# Patient Record
Sex: FEMALE | Race: WHITE | NOT HISPANIC OR LATINO | Employment: FULL TIME | URBAN - METROPOLITAN AREA
[De-identification: names, ages, dates, MRNs, and addresses within clinical notes are randomized per-mention and may not be internally consistent; named-entity substitution may affect disease eponyms.]

---

## 2017-02-01 ENCOUNTER — CONVERSION ENCOUNTER (OUTPATIENT)
Dept: MAMMOGRAPHY | Facility: HOSPITAL | Age: 52
End: 2017-02-01

## 2017-02-13 ENCOUNTER — CONVERSION ENCOUNTER (OUTPATIENT)
Dept: MAMMOGRAPHY | Facility: HOSPITAL | Age: 52
End: 2017-02-13

## 2017-08-14 ENCOUNTER — CONVERSION ENCOUNTER (OUTPATIENT)
Dept: MAMMOGRAPHY | Facility: HOSPITAL | Age: 52
End: 2017-08-14

## 2017-08-22 ENCOUNTER — CONVERSION ENCOUNTER (OUTPATIENT)
Dept: MAMMOGRAPHY | Facility: HOSPITAL | Age: 52
End: 2017-08-22

## 2019-03-06 ENCOUNTER — HOSPITAL ENCOUNTER (OUTPATIENT)
Dept: OTHER | Facility: HOSPITAL | Age: 54
Discharge: HOME | End: 2019-03-06
Attending: NURSE PRACTITIONER

## 2019-09-14 ENCOUNTER — HOSPITAL ENCOUNTER (OUTPATIENT)
Dept: URGENT CARE | Facility: CLINIC | Age: 54
Discharge: HOME | End: 2019-09-14

## 2020-02-28 ENCOUNTER — OFFICE VISIT CONVERTED (OUTPATIENT)
Dept: ORTHOPEDIC SURGERY | Facility: CLINIC | Age: 55
End: 2020-02-28
Attending: PHYSICIAN ASSISTANT

## 2020-02-28 ENCOUNTER — CONVERSION ENCOUNTER (OUTPATIENT)
Dept: ORTHOPEDIC SURGERY | Facility: CLINIC | Age: 55
End: 2020-02-28

## 2020-03-06 ENCOUNTER — HOSPITAL ENCOUNTER (OUTPATIENT)
Dept: MRI IMAGING | Facility: HOSPITAL | Age: 55
Discharge: HOME | End: 2020-03-06
Attending: PHYSICIAN ASSISTANT

## 2020-03-11 ENCOUNTER — OFFICE VISIT CONVERTED (OUTPATIENT)
Dept: ORTHOPEDIC SURGERY | Facility: CLINIC | Age: 55
End: 2020-03-11
Attending: PHYSICIAN ASSISTANT

## 2020-03-11 ENCOUNTER — CONVERSION ENCOUNTER (OUTPATIENT)
Dept: ORTHOPEDIC SURGERY | Facility: CLINIC | Age: 55
End: 2020-03-11

## 2020-03-16 ENCOUNTER — OFFICE VISIT CONVERTED (OUTPATIENT)
Dept: ORTHOPEDIC SURGERY | Facility: CLINIC | Age: 55
End: 2020-03-16
Attending: ORTHOPAEDIC SURGERY

## 2020-05-12 ENCOUNTER — HOSPITAL ENCOUNTER (OUTPATIENT)
Dept: PERIOP | Facility: HOSPITAL | Age: 55
Setting detail: HOSPITAL OUTPATIENT SURGERY
Discharge: HOME | End: 2020-05-12
Attending: ORTHOPAEDIC SURGERY

## 2020-05-27 ENCOUNTER — OFFICE VISIT CONVERTED (OUTPATIENT)
Dept: ORTHOPEDIC SURGERY | Facility: CLINIC | Age: 55
End: 2020-05-27
Attending: PHYSICIAN ASSISTANT

## 2020-05-27 ENCOUNTER — CONVERSION ENCOUNTER (OUTPATIENT)
Dept: ORTHOPEDIC SURGERY | Facility: CLINIC | Age: 55
End: 2020-05-27

## 2020-06-24 ENCOUNTER — OFFICE VISIT CONVERTED (OUTPATIENT)
Dept: ORTHOPEDIC SURGERY | Facility: CLINIC | Age: 55
End: 2020-06-24
Attending: PHYSICIAN ASSISTANT

## 2020-08-10 ENCOUNTER — OFFICE VISIT CONVERTED (OUTPATIENT)
Dept: ORTHOPEDIC SURGERY | Facility: CLINIC | Age: 55
End: 2020-08-10
Attending: PHYSICIAN ASSISTANT

## 2020-09-21 ENCOUNTER — OFFICE VISIT CONVERTED (OUTPATIENT)
Dept: ORTHOPEDIC SURGERY | Facility: CLINIC | Age: 55
End: 2020-09-21
Attending: PHYSICIAN ASSISTANT

## 2020-12-23 ENCOUNTER — HOSPITAL ENCOUNTER (OUTPATIENT)
Dept: OTHER | Facility: HOSPITAL | Age: 55
Discharge: HOME | End: 2020-12-23
Attending: INTERNAL MEDICINE

## 2021-01-16 ENCOUNTER — HOSPITAL ENCOUNTER (OUTPATIENT)
Dept: OTHER | Facility: HOSPITAL | Age: 56
Discharge: HOME | End: 2021-01-16
Attending: INTERNAL MEDICINE

## 2021-05-13 NOTE — PROGRESS NOTES
Progress Note      Patient Name: Vi Cruz   Patient ID: [id number]   Sex: Female   YOB: 1965    Primary Care Provider: Kim BRICEÑO   Referring Provider: Brandon Salmeron MD    Visit Date: 2020    Provider: Cristy Sutton PA-C   Location: [facility]   Location Address: [address] [place] [address]  [id number]   Location Phone: [phone]          Chief Complaint  · Left shoulder pain       History Of Present Illness  Vi Cruz is a 54 year old /White female who presents today to [facility].      She is s/p left arthroscopic SAD/DC, biceps tenotomy and mini open RCR 20 by Dr. Salmeron. She states little to no pain. Her ROM is improved compared to pre op. She made excellent progress in PT. She is back to work without issue. She states her strength is lacking.       Past Medical History  Broken Bones; Scoliosis; Seasonal allergies         Past Surgical History  Ankle surgery; Breast biopsy; Colonoscopy; Dilation and curretage; Hysterectomy         Medication List  cyclobenzaprine 10 mg oral tablet; diclofenac sodium 75 mg oral tablet,delayed release (DR/EC); multivitamin oral tablet; venlafaxine 37.5 mg oral capsule,extended release 24hr         Allergy List  Adhesives; Metals; Milk       Allergies Reconciled  Family Medical History  Bladder Neoplasm, Malignant; Stroke; Cancer, Unspecified; Diabetes, unspecified type; Colon Cancer; Prostate cancer; Family history of Arthritis         Reproductive History   4 Para 3 0 0 0       Social History  Alcohol (Current some day); Alcohol Use (Current some day); lives with spouse; .; 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); Nurse; 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); 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); Working         Review of Systems  · Constitutional  o Denies  o : fever, chills, weight loss  · Cardiovascular  o Denies  o : chest pain, shortness of breath  · Gastrointestinal  o Denies  o : liver  "disease, heartburn, nausea, blood in stools  · Genitourinary  o Denies  o : painful urination, blood in urine  · Integument  o Denies  o : rash, itching  · Neurologic  o Denies  o : headache, weakness, loss of consciousness  · Musculoskeletal  o Admits  o : painful, swollen joints  · Psychiatric  o Denies  o : drug/alcohol addiction, anxiety, depression      Vitals  Date Time BP Position Site L\R Cuff Size HR RR TEMP (F) WT  HT  BMI kg/m2 BSA m2 O2 Sat        09/21/2020 08:38 AM      72 - R   211lbs 6oz 5'  7\" 33.11 2.13 96 %          Physical Examination  · Constitutional  o Appearance  o : well developed, well-nourished, no obvious deformities present  · Head and Face  o Head  o :   § Inspection  § : normocephalic  o Face  o :   § Inspection  § : no facial lesions  · Eyes  o Conjunctivae  o : conjunctivae normal  o Sclerae  o : sclerae white  · Ears, Nose, Mouth and Throat  o Ears  o :   § External Ears  § : appearance within normal limits  § Hearing  § : intact  o Nose  o :   § External Nose  § : appearance normal  · Neck  o Inspection/Palpation  o : normal appearance  o Range of Motion  o : full range of motion  · Respiratory  o Respiratory Effort  o : breathing unlabored  o Inspection of Chest  o : normal appearance  o Auscultation of Lungs  o : no audible wheezing or rales  · Cardiovascular  o Heart  o : regular rate  · Gastrointestinal  o Abdominal Examination  o : soft and non-tender  · Skin and Subcutaneous Tissue  o General Inspection  o : intact, no rashes  · Psychiatric  o General  o : Alert and oriented x3  o Judgement and Insight  o : judgment and insight intact  o Mood and Affect  o : mood normal, affect appropriate  · Left Shoulder  o Inspection  o : Well healed incisions. No signs of infection. Forward flexion 170 degrees. Abduction 170 degrees. ER 75 degrees. IR to L1. Strength 4/5. Neurovascularly intact.           Assessment  · Left shoulder pain, unspecified " chronicity     719.41/M25.512  · History of repair of left rotator cuff     V15.29/Z98.890      Plan  · Medications  o Medications have been Reconciled  o Transition of Care or Provider Policy  · Instructions  o Reviewed the patient's Past Medical, Social, and Family history as well as the ROS at today's visit, no changes.  o Call or return if worsening symptoms.  o She will continue PT for strengthening. Follow Up PRN.            Electronically Signed by: Cristy Sutton PA-C -Author on September 21, 2020 08:56:02 AM  Electronically Co-signed by: Brandon Salmeron MD -Reviewer on September 22, 2020 10:18:07 PM

## 2021-05-13 NOTE — PROGRESS NOTES
Progress Note      Patient Name: Vi Cruz   Patient ID: [id number]   Sex: Female   YOB: 1965    Primary Care Provider: Kim BRICEÑO   Referring Provider: Brandon Salmeron MD    Visit Date: August 10, 2020    Provider: Cristy Sutton PA-C   Location: [facility]   Location Address: [address] [place] [address]  [id number]   Location Phone: [phone]          Chief Complaint  · Left shoulder pain       History Of Present Illness  Vi Cruz is a 54 year old /White female who presents today to [facility].      She is s/p left arthroscopic SAD/DC, biceps tenotomy and mini open RCR 20 by Dr. Salmeron. She is doing well. She is making progress in PT. She states pain is improved and her motion is getting better.       Past Medical History  Broken Bones; Scoliosis; Seasonal allergies         Past Surgical History  Ankle surgery; Breast biopsy; Colonoscopy; Dilation and curretage; Hysterectomy         Medication List  cyclobenzaprine 10 mg oral tablet; diclofenac sodium 75 mg oral tablet,delayed release (DR/EC); multivitamin oral tablet; venlafaxine 37.5 mg oral capsule,extended release 24hr         Allergy List  Adhesives; Metals; Milk       Allergies Reconciled  Family Medical History  Bladder Neoplasm, Malignant; Stroke; Cancer, Unspecified; Diabetes, unspecified type; Colon Cancer; Prostate cancer; Family history of Arthritis         Reproductive History   4 Para 3 0 0 0       Social History  Alcohol (Current some day); Alcohol Use (Current some day); lives with spouse; .; 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); Nurse; 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); 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); Working         Review of Systems  · Constitutional  o Denies  o : fever, chills, weight loss  · Cardiovascular  o Denies  o : chest pain, shortness of breath  · Gastrointestinal  o Denies  o : liver disease, heartburn, nausea, blood in stools  · Genitourinary  o Denies  o :  "painful urination, blood in urine  · Integument  o Denies  o : rash, itching  · Neurologic  o Denies  o : headache, weakness, loss of consciousness  · Musculoskeletal  o Admits  o : painful, swollen joints  · Psychiatric  o Denies  o : drug/alcohol addiction, anxiety, depression      Vitals  Date Time BP Position Site L\R Cuff Size HR RR TEMP (F) WT  HT  BMI kg/m2 BSA m2 O2 Sat        08/10/2020 08:38 AM      96 - R   211lbs 2oz 5'  7\" 33.07 2.13 97 %          Physical Examination  · Constitutional  o Appearance  o : well developed, well-nourished, no obvious deformities present  · Head and Face  o Head  o :   § Inspection  § : normocephalic  o Face  o :   § Inspection  § : no facial lesions  · Eyes  o Conjunctivae  o : conjunctivae normal  o Sclerae  o : sclerae white  · Ears, Nose, Mouth and Throat  o Ears  o :   § External Ears  § : appearance within normal limits  § Hearing  § : intact  o Nose  o :   § External Nose  § : appearance normal  · Neck  o Inspection/Palpation  o : normal appearance  o Range of Motion  o : full range of motion  · Respiratory  o Respiratory Effort  o : breathing unlabored  o Inspection of Chest  o : normal appearance  o Auscultation of Lungs  o : no audible wheezing or rales  · Cardiovascular  o Heart  o : regular rate  · Gastrointestinal  o Abdominal Examination  o : soft and non-tender  · Skin and Subcutaneous Tissue  o General Inspection  o : intact, no rashes  · Psychiatric  o General  o : Alert and oriented x3  o Judgement and Insight  o : judgment and insight intact  o Mood and Affect  o : mood normal, affect appropriate  · Left Shoulder  o Inspection  o : Well healed incisions. No signs of infection. Forward flexion 120 degrees. Abduction 100 degrees. ER 60 degrees. IR to L1. Strength 4/5. Neurovascularly intact.           Assessment  · Aftercare following surgery of the muskuloskeletal system     V54.81  · Left shoulder pain, unspecified " chronicity     719.41/M25.512      Plan  · Medications  o Medications have been Reconciled  o Transition of Care or Provider Policy  · Instructions  o Reviewed the patient's Past Medical, Social, and Family history as well as the ROS at today's visit, no changes.  o Call or return if worsening symptoms.  o Continue PT. Follow up 6 weeks.   o Electronically Identified Patient Education Materials Provided Electronically            Electronically Signed by: FERNANDEZ Marin-C -Author on August 10, 2020 09:39:04 AM  Electronically Co-signed by: Brandon Salmeron MD -Reviewer on August 10, 2020 10:02:18 PM

## 2021-05-13 NOTE — PROGRESS NOTES
Progress Note      Patient Name: Vi Cruz   Patient ID: [id number]   Sex: Female   YOB: 1965    Primary Care Provider: Kim BRICEÑO    Visit Date: May 27, 2020    Provider: Cristy Sutton PA-C   Location: [facility]   Location Address: [address] [place] [address]  [id number]   Location Phone: [phone]          Chief Complaint  · Surgery follow up left shoulder      History Of Present Illness  Vi Cruz is a 54 year old /White female who presents today to [facility].      She is s/p left arthroscopic SAD/DC, biceps tenotomy and mini open RCR 20 by Dr. Salmeron. She is doing well. She states pain is improved. She is compliant with ultrasling.       Past Medical History  Broken Bones; Scoliosis; Seasonal allergies         Past Surgical History  Ankle surgery; Breast biopsy; Colonoscopy; Dilation and curretage; Hysterectomy         Medication List  cyclobenzaprine 10 mg oral tablet; diclofenac sodium 75 mg oral tablet,delayed release (DR/EC); multivitamin oral tablet; venlafaxine 37.5 mg oral capsule,extended release 24hr         Allergy List  Adhesives; Metals; Milk       Allergies Reconciled  Family Medical History  Bladder Neoplasm, Malignant; Stroke; Cancer, Unspecified; Diabetes, unspecified type; Colon Cancer; Prostate cancer; Family history of Arthritis         Reproductive History   4 Para 3 0 0 0       Social History  Alcohol (Current some day); Alcohol Use (Current some day); lives with spouse; .; 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); Nurse; 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); 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); Working         Review of Systems  · Constitutional  o Denies  o : fever, chills, weight loss  · Cardiovascular  o Denies  o : chest pain, shortness of breath  · Gastrointestinal  o Denies  o : liver disease, heartburn, nausea, blood in stools  · Genitourinary  o Denies  o : painful urination, blood in  "urine  · Integument  o Denies  o : rash, itching  · Neurologic  o Denies  o : headache, weakness, loss of consciousness  · Musculoskeletal  o Admits  o : painful, swollen joints  · Psychiatric  o Denies  o : drug/alcohol addiction, anxiety, depression      Vitals  Date Time BP Position Site L\R Cuff Size HR RR TEMP (F) WT  HT  BMI kg/m2 BSA m2 O2 Sat        05/27/2020 03:14 PM      85 - R   205lbs 16oz 5'  7\" 32.26 2.1 98 %          Physical Examination  · Constitutional  o Appearance  o : well developed, well-nourished, no obvious deformities present  · Head and Face  o Head  o :   § Inspection  § : normocephalic  o Face  o :   § Inspection  § : no facial lesions  · Eyes  o Conjunctivae  o : conjunctivae normal  o Sclerae  o : sclerae white  · Ears, Nose, Mouth and Throat  o Ears  o :   § External Ears  § : appearance within normal limits  § Hearing  § : intact  o Nose  o :   § External Nose  § : appearance normal  · Neck  o Inspection/Palpation  o : normal appearance  o Range of Motion  o : full range of motion  · Respiratory  o Respiratory Effort  o : breathing unlabored  o Inspection of Chest  o : normal appearance  o Auscultation of Lungs  o : no audible wheezing or rales  · Cardiovascular  o Heart  o : regular rate  · Gastrointestinal  o Abdominal Examination  o : soft and non-tender  · Skin and Subcutaneous Tissue  o General Inspection  o : intact, no rashes  · Psychiatric  o General  o : Alert and oriented x3  o Judgement and Insight  o : judgment and insight intact  o Mood and Affect  o : mood normal, affect appropriate  · Left Shoulder  o Inspection  o : Well approximated incisions. No signs of infection. All compartments soft and well perfused. Sensation grossly intact.           Assessment  · Left Aftercare following surgery of the muskuloskeletal system     V54.81      Plan  · Medications  o Medications have been Reconciled  o Transition of Care or Provider Policy  · Instructions  o Reviewed the " patient's Past Medical, Social, and Family history as well as the ROS at today's visit, no changes.  o Call or return if worsening symptoms.  o Start PT in 2 weeks. Follow up 4 weeks. Continue sling. PROM exercises demonstrated.   o Electronically Identified Patient Education Materials Provided Electronically            Electronically Signed by: MARTA MarinC -Author on May 27, 2020 03:37:47 PM

## 2021-05-13 NOTE — PROGRESS NOTES
Progress Note      Patient Name: Vi Cruz   Patient ID: [id number]   Sex: Female   YOB: 1965    Primary Care Provider: Kim BRICEÑO   Referring Provider: Brandon Salmeron MD    Visit Date: 2020    Provider: Cristy Sutton PA-C   Location: [facility]   Location Address: [address] [place] [address]  [id number]   Location Phone: [phone]          Chief Complaint  · Follow up left shoulder arthroscopy      History Of Present Illness  Vi Cruz is a 54 year old /White female who presents today to [facility].      She is s/p left arthroscopic SAD/DC, biceps tenotomy and mini open RCR 20 by Dr. Salmeron. She is doing well. She is making progress in PT.       Past Medical History  Broken Bones; Scoliosis; Seasonal allergies         Past Surgical History  Ankle surgery; Breast biopsy; Colonoscopy; Dilation and curretage; Hysterectomy         Medication List  cyclobenzaprine 10 mg oral tablet; diclofenac sodium 75 mg oral tablet,delayed release (/EC); multivitamin oral tablet; venlafaxine 37.5 mg oral capsule,extended release 24hr         Allergy List  Adhesives; Metals; Milk       Allergies Reconciled  Family Medical History  Bladder Neoplasm, Malignant; Stroke; Cancer, Unspecified; Diabetes, unspecified type; Colon Cancer; Prostate cancer; Family history of Arthritis         Reproductive History   4 Para 3 0 0 0       Social History  Alcohol (Current some day); Alcohol Use (Current some day); lives with spouse; .; 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); Nurse; 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); 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); Working         Review of Systems  · Constitutional  o Denies  o : fever, chills, weight loss  · Cardiovascular  o Denies  o : chest pain, shortness of breath  · Gastrointestinal  o Denies  o : liver disease, heartburn, nausea, blood in stools  · Genitourinary  o Denies  o : painful urination, blood in  "urine  · Integument  o Denies  o : rash, itching  · Neurologic  o Denies  o : headache, weakness, loss of consciousness  · Musculoskeletal  o Admits  o : painful, swollen joints  · Psychiatric  o Denies  o : drug/alcohol addiction, anxiety, depression      Vitals  Date Time BP Position Site L\R Cuff Size HR RR TEMP (F) WT  HT  BMI kg/m2 BSA m2 O2 Sat        06/24/2020 09:11 AM      93 - R   205lbs 16oz 5'  7\" 32.26 2.1 98 %          Physical Examination  · Constitutional  o Appearance  o : well developed, well-nourished, no obvious deformities present  · Head and Face  o Head  o :   § Inspection  § : normocephalic  o Face  o :   § Inspection  § : no facial lesions  · Eyes  o Conjunctivae  o : conjunctivae normal  o Sclerae  o : sclerae white  · Ears, Nose, Mouth and Throat  o Ears  o :   § External Ears  § : appearance within normal limits  § Hearing  § : intact  o Nose  o :   § External Nose  § : appearance normal  · Neck  o Inspection/Palpation  o : normal appearance  o Range of Motion  o : full range of motion  · Respiratory  o Respiratory Effort  o : breathing unlabored  o Inspection of Chest  o : normal appearance  o Auscultation of Lungs  o : no audible wheezing or rales  · Cardiovascular  o Heart  o : regular rate  · Gastrointestinal  o Abdominal Examination  o : soft and non-tender  · Skin and Subcutaneous Tissue  o General Inspection  o : intact, no rashes  · Psychiatric  o General  o : Alert and oriented x3  o Judgement and Insight  o : judgment and insight intact  o Mood and Affect  o : mood normal, affect appropriate  · Left Shoulder  o Inspection  o : Well healed incision. Forward flexion 90 degrees. Abduction 90 degrees. ER 30 degrees. Strength 3+/5. Neurovascularly intact.          Assessment  · Left Aftercare following surgery of the muskuloskeletal system     V54.81      Plan  · Medications  o Medications have been Reconciled  o Transition of Care or Provider Policy  · Instructions  o Reviewed the " patient's Past Medical, Social, and Family history as well as the ROS at today's visit, no changes.  o Call or return if worsening symptoms.  o Continue Pt. Follow up 6 weeks. Continue work restrictions. Discussed precautions.   o Electronically Identified Patient Education Materials Provided Electronically            Electronically Signed by: FERNANDEZ Marin-C -Author on June 24, 2020 09:21:56 AM  Electronically Co-signed by: Brandon Salmeron MD -Reviewer on June 26, 2020 04:17:05 PM

## 2021-05-14 VITALS — HEIGHT: 67 IN | HEART RATE: 72 BPM | WEIGHT: 211.37 LBS | BODY MASS INDEX: 33.18 KG/M2 | OXYGEN SATURATION: 96 %

## 2021-05-15 VITALS — BODY MASS INDEX: 33.13 KG/M2 | OXYGEN SATURATION: 97 % | HEIGHT: 67 IN | HEART RATE: 96 BPM | WEIGHT: 211.12 LBS

## 2021-05-15 VITALS — BODY MASS INDEX: 31.86 KG/M2 | HEART RATE: 71 BPM | OXYGEN SATURATION: 98 % | HEIGHT: 67 IN | WEIGHT: 203 LBS

## 2021-05-15 VITALS — HEIGHT: 67 IN | WEIGHT: 206 LBS | OXYGEN SATURATION: 98 % | HEART RATE: 93 BPM | BODY MASS INDEX: 32.33 KG/M2

## 2021-05-15 VITALS — BODY MASS INDEX: 32.65 KG/M2 | WEIGHT: 208 LBS | HEART RATE: 72 BPM | OXYGEN SATURATION: 98 % | HEIGHT: 67 IN

## 2021-05-15 VITALS — WEIGHT: 206 LBS | BODY MASS INDEX: 32.33 KG/M2 | HEART RATE: 85 BPM | OXYGEN SATURATION: 98 % | HEIGHT: 67 IN

## 2021-05-15 VITALS — HEART RATE: 71 BPM | OXYGEN SATURATION: 96 % | BODY MASS INDEX: 32.8 KG/M2 | HEIGHT: 67 IN | WEIGHT: 209 LBS

## 2023-04-06 ENCOUNTER — OFFICE VISIT (OUTPATIENT)
Dept: FAMILY MEDICINE CLINIC | Facility: CLINIC | Age: 58
End: 2023-04-06
Payer: COMMERCIAL

## 2023-04-06 VITALS
BODY MASS INDEX: 32.65 KG/M2 | OXYGEN SATURATION: 95 % | HEIGHT: 67 IN | WEIGHT: 208 LBS | SYSTOLIC BLOOD PRESSURE: 134 MMHG | HEART RATE: 88 BPM | TEMPERATURE: 97.3 F | DIASTOLIC BLOOD PRESSURE: 88 MMHG

## 2023-04-06 DIAGNOSIS — Z11.59 NEED FOR HEPATITIS C SCREENING TEST: ICD-10-CM

## 2023-04-06 DIAGNOSIS — L57.0 ACTINIC KERATOSIS: ICD-10-CM

## 2023-04-06 DIAGNOSIS — R53.83 OTHER FATIGUE: ICD-10-CM

## 2023-04-06 DIAGNOSIS — Z12.11 ENCOUNTER FOR SCREENING FOR MALIGNANT NEOPLASM OF COLON: ICD-10-CM

## 2023-04-06 DIAGNOSIS — Z12.39 BREAST CANCER SCREENING, HIGH RISK PATIENT: ICD-10-CM

## 2023-04-06 DIAGNOSIS — Z00.00 ANNUAL PHYSICAL EXAM: Primary | ICD-10-CM

## 2023-04-06 DIAGNOSIS — Z13.220 SCREENING CHOLESTEROL LEVEL: ICD-10-CM

## 2023-04-06 DIAGNOSIS — R73.09 ELEVATED GLUCOSE: ICD-10-CM

## 2023-04-06 PROCEDURE — 99386 PREV VISIT NEW AGE 40-64: CPT | Performed by: NURSE PRACTITIONER

## 2023-04-06 RX ORDER — CHOLECALCIFEROL (VITAMIN D3) 125 MCG
5 CAPSULE ORAL
COMMUNITY

## 2023-04-06 NOTE — PROGRESS NOTES
Chief Complaint  Annual Exam    PHQ-2 Total Score: 0    Subjective        History reviewed. No pertinent past medical history.  Social History     Tobacco Use   • Smoking status: Never   • Smokeless tobacco: Never   Vaping Use   • Vaping Use: Never used   Substance Use Topics   • Alcohol use: Yes     Comment: social      Current Outpatient Medications on File Prior to Visit   Medication Sig   • Biotin 10 MG chewable tablet Chew.   • cholecalciferol (VITAMIN D3) 10 MCG (400 UNIT) tablet Take  by mouth Daily.   • cyanocobalamin (VITAMIN B-12) 1000 MCG tablet Take  by mouth Daily.   • melatonin 5 MG tablet tablet Take 1 tablet by mouth.   • Omega-3 Fatty Acids (fish oil) 1000 MG capsule capsule Take  by mouth.   • [DISCONTINUED] tamoxifen (NOLVADEX) 20 MG chemo tablet Take 1 tablet by mouth Daily.   • [DISCONTINUED] venlafaxine XR (EFFEXOR-XR) 37.5 MG 24 hr capsule Take 1 capsule by mouth Daily.     No current facility-administered medications on file prior to visit.      Allergies   Allergen Reactions   • Milk-Related Compounds Other (See Comments)     GI UPSET    • Lac Bovis GI Intolerance   • Adhesive Tape Rash   • Other Itching     All METALS per patient      Health Maintenance Due   Topic Date Due   • TDAP/TD VACCINES (1 - Tdap) Never done   • COVID-19 Vaccine (2 - Mixed Product series) 02/13/2021   • HEPATITIS C SCREENING  Never done   • ANNUAL PHYSICAL  Never done   • COLORECTAL CANCER SCREENING  12/20/2022      Vi Cruz presents to [facility]  History of Present Illness  Here for an annual wellness exam. 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     Last colon cancer screening was after hysterectomy. She was told to follow up in 5 years, over due. Hysterectomy .     She was on Tamoxifen for 5 years for a breast cancer growth but not stageable. And had a partial mastectomy. Pt states she has [facility] imaging but no longer wants to go to [place].  "Denies lumps, tenderness, or change in skin.     Elevated BP reading in office. States she is usually normotensive.     Exercise includes walking in the hospital. Has gym membership.     Has adult children.    Multiple skin lesions to lower legs and chest.      Right lateral posterior knee with spider veins.     Sees chiropractor for scoliosis and notes increased tightening.     Patient reports that she will check her glucose occasionally and that it is usually not below 100 fasting.  Notes family history of diabetes.      Objective   Vital Signs:   /88   Pulse 88   Temp 97.3 °F (36.3 °C)   Ht 168.9 cm (66.5\")   Wt 94.3 kg (208 lb)   SpO2 95%   BMI 33.07 kg/m²     Review of Systems   Constitutional: Positive for fatigue. Negative for unexpected weight gain and unexpected weight loss.   HENT: Negative for trouble swallowing.    Respiratory: Negative for cough, shortness of breath and wheezing.    Cardiovascular: Negative for chest pain, palpitations and leg swelling.   Endocrine: Negative for polydipsia and polyuria.   Genitourinary: Negative for breast discharge, breast lump and breast pain.      Physical Exam  Vitals reviewed.   Constitutional:       General: She is not in acute distress.     Appearance: Normal appearance. She is well-developed.   HENT:      Head: Normocephalic and atraumatic.      Right Ear: External ear normal.      Left Ear: External ear normal.   Eyes:      Conjunctiva/sclera: Conjunctivae normal.      Pupils: Pupils are equal, round, and reactive to light.   Neck:      Thyroid: No thyroid mass, thyromegaly or thyroid tenderness.   Cardiovascular:      Rate and Rhythm: Normal rate and regular rhythm.      Heart sounds: Normal heart sounds.   Pulmonary:      Effort: Pulmonary effort is normal.      Breath sounds: Normal breath sounds.   Musculoskeletal:      Cervical back: Neck supple. No tenderness.      Right lower leg: No edema.      Left lower leg: No edema.   Skin:     General: " Skin is warm and dry.          Neurological:      Mental Status: She is alert and oriented to person, place, and time.   Psychiatric:         Mood and Affect: Mood and affect normal.         Behavior: Behavior normal.         Thought Content: Thought content normal.         Judgment: Judgment normal.        Result Review :   The following data was reviewed by: KEYANNA Kaba on 04/06/2023:  SCANNED - COLONOSCOPY (12/20/2017)    SCANNED PATHOLOGY (06/20/2017)           Assessment and Plan    Diagnoses and all orders for this visit:    1. Annual physical exam (Primary)  -     Comprehensive Metabolic Panel  -     Urinalysis With Culture If Indicated -  -     Hemoglobin A1c    2. Encounter for screening for malignant neoplasm of colon  -     Ambulatory Referral For Screening Colonoscopy    3. Breast cancer screening, high risk patient  -     MRI Breast Bilateral Screening With & Without Contrast; Future    4. Other fatigue  -     CBC & Differential  -     TSH Rfx On Abnormal To Free T4  -     Vitamin B12 & Folate  -     Vitamin D,25-Hydroxy    5. Screening cholesterol level  -     Lipid Panel    6. Elevated glucose  -     Hemoglobin A1c    7. Actinic keratosis  -     Ambulatory Referral to Dermatology    8. Need for hepatitis C screening test  -     Hepatitis C Antibody    Follow a healthy diet and get regular exercise.    Follow Up   Return in about 1 year (around 4/6/2024) for Annual physical.  Patient was given instructions and counseling regarding her condition or for health maintenance advice. Please see specific information pulled into the AVS if appropriate.

## 2023-05-09 ENCOUNTER — LAB (OUTPATIENT)
Dept: LAB | Facility: HOSPITAL | Age: 58
End: 2023-05-09
Payer: COMMERCIAL

## 2023-05-09 LAB
25(OH)D3 SERPL-MCNC: 48 NG/ML (ref 30–100)
ALBUMIN SERPL-MCNC: 4.5 G/DL (ref 3.5–5.2)
ALBUMIN/GLOB SERPL: 1.7 G/DL
ALP SERPL-CCNC: 63 U/L (ref 39–117)
ALT SERPL W P-5'-P-CCNC: 25 U/L (ref 1–33)
ANION GAP SERPL CALCULATED.3IONS-SCNC: 10.5 MMOL/L (ref 5–15)
AST SERPL-CCNC: 26 U/L (ref 1–32)
BACTERIA UR QL AUTO: ABNORMAL /HPF
BASOPHILS # BLD AUTO: 0.06 10*3/MM3 (ref 0–0.2)
BASOPHILS NFR BLD AUTO: 1.3 % (ref 0–1.5)
BILIRUB SERPL-MCNC: 0.4 MG/DL (ref 0–1.2)
BILIRUB UR QL STRIP: NEGATIVE
BUN SERPL-MCNC: 13 MG/DL (ref 6–20)
BUN/CREAT SERPL: 13.7 (ref 7–25)
CALCIUM SPEC-SCNC: 10.3 MG/DL (ref 8.6–10.5)
CHLORIDE SERPL-SCNC: 100 MMOL/L (ref 98–107)
CHOLEST SERPL-MCNC: 229 MG/DL (ref 0–200)
CLARITY UR: CLEAR
CO2 SERPL-SCNC: 27.5 MMOL/L (ref 22–29)
COLOR UR: YELLOW
CREAT SERPL-MCNC: 0.95 MG/DL (ref 0.57–1)
DEPRECATED RDW RBC AUTO: 39 FL (ref 37–54)
EGFRCR SERPLBLD CKD-EPI 2021: 70.5 ML/MIN/1.73
EOSINOPHIL # BLD AUTO: 0.21 10*3/MM3 (ref 0–0.4)
EOSINOPHIL NFR BLD AUTO: 4.6 % (ref 0.3–6.2)
ERYTHROCYTE [DISTWIDTH] IN BLOOD BY AUTOMATED COUNT: 11.9 % (ref 12.3–15.4)
FOLATE SERPL-MCNC: 10.9 NG/ML (ref 4.78–24.2)
GLOBULIN UR ELPH-MCNC: 2.7 GM/DL
GLUCOSE SERPL-MCNC: 105 MG/DL (ref 65–99)
GLUCOSE UR STRIP-MCNC: NEGATIVE MG/DL
HBA1C MFR BLD: 6 % (ref 4.8–5.6)
HCT VFR BLD AUTO: 39.4 % (ref 34–46.6)
HCV AB SER DONR QL: NORMAL
HDLC SERPL-MCNC: 83 MG/DL (ref 40–60)
HGB BLD-MCNC: 13.6 G/DL (ref 12–15.9)
HGB UR QL STRIP.AUTO: NEGATIVE
HYALINE CASTS UR QL AUTO: ABNORMAL /LPF
IMM GRANULOCYTES # BLD AUTO: 0.04 10*3/MM3 (ref 0–0.05)
IMM GRANULOCYTES NFR BLD AUTO: 0.9 % (ref 0–0.5)
KETONES UR QL STRIP: NEGATIVE
LDLC SERPL CALC-MCNC: 129 MG/DL (ref 0–100)
LDLC/HDLC SERPL: 1.52 {RATIO}
LEUKOCYTE ESTERASE UR QL STRIP.AUTO: ABNORMAL
LYMPHOCYTES # BLD AUTO: 1.64 10*3/MM3 (ref 0.7–3.1)
LYMPHOCYTES NFR BLD AUTO: 36 % (ref 19.6–45.3)
MCH RBC QN AUTO: 30.5 PG (ref 26.6–33)
MCHC RBC AUTO-ENTMCNC: 34.5 G/DL (ref 31.5–35.7)
MCV RBC AUTO: 88.3 FL (ref 79–97)
MONOCYTES # BLD AUTO: 0.38 10*3/MM3 (ref 0.1–0.9)
MONOCYTES NFR BLD AUTO: 8.4 % (ref 5–12)
NEUTROPHILS NFR BLD AUTO: 2.22 10*3/MM3 (ref 1.7–7)
NEUTROPHILS NFR BLD AUTO: 48.8 % (ref 42.7–76)
NITRITE UR QL STRIP: NEGATIVE
NRBC BLD AUTO-RTO: 0 /100 WBC (ref 0–0.2)
PH UR STRIP.AUTO: 6.5 [PH] (ref 5–8)
PLATELET # BLD AUTO: 259 10*3/MM3 (ref 140–450)
PMV BLD AUTO: 10.5 FL (ref 6–12)
POTASSIUM SERPL-SCNC: 4.5 MMOL/L (ref 3.5–5.2)
PROT SERPL-MCNC: 7.2 G/DL (ref 6–8.5)
PROT UR QL STRIP: NEGATIVE
RBC # BLD AUTO: 4.46 10*6/MM3 (ref 3.77–5.28)
RBC # UR STRIP: ABNORMAL /HPF
REF LAB TEST METHOD: ABNORMAL
SODIUM SERPL-SCNC: 138 MMOL/L (ref 136–145)
SP GR UR STRIP: 1.01 (ref 1–1.03)
SQUAMOUS #/AREA URNS HPF: ABNORMAL /HPF
TRIGL SERPL-MCNC: 100 MG/DL (ref 0–150)
TSH SERPL DL<=0.05 MIU/L-ACNC: 1.62 UIU/ML (ref 0.27–4.2)
UROBILINOGEN UR QL STRIP: ABNORMAL
VIT B12 BLD-MCNC: 942 PG/ML (ref 211–946)
VLDLC SERPL-MCNC: 17 MG/DL (ref 5–40)
WBC # UR STRIP: ABNORMAL /HPF
WBC NRBC COR # BLD: 4.55 10*3/MM3 (ref 3.4–10.8)

## 2023-05-09 PROCEDURE — 87086 URINE CULTURE/COLONY COUNT: CPT | Performed by: NURSE PRACTITIONER

## 2023-05-09 PROCEDURE — 82607 VITAMIN B-12: CPT | Performed by: NURSE PRACTITIONER

## 2023-05-09 PROCEDURE — 86803 HEPATITIS C AB TEST: CPT | Performed by: NURSE PRACTITIONER

## 2023-05-09 PROCEDURE — 82746 ASSAY OF FOLIC ACID SERUM: CPT | Performed by: NURSE PRACTITIONER

## 2023-05-09 PROCEDURE — 80061 LIPID PANEL: CPT | Performed by: NURSE PRACTITIONER

## 2023-05-09 PROCEDURE — 80053 COMPREHEN METABOLIC PANEL: CPT | Performed by: NURSE PRACTITIONER

## 2023-05-09 PROCEDURE — 87088 URINE BACTERIA CULTURE: CPT | Performed by: NURSE PRACTITIONER

## 2023-05-09 PROCEDURE — 81001 URINALYSIS AUTO W/SCOPE: CPT | Performed by: NURSE PRACTITIONER

## 2023-05-09 PROCEDURE — 82306 VITAMIN D 25 HYDROXY: CPT | Performed by: NURSE PRACTITIONER

## 2023-05-09 PROCEDURE — 85025 COMPLETE CBC W/AUTO DIFF WBC: CPT | Performed by: NURSE PRACTITIONER

## 2023-05-09 PROCEDURE — 83036 HEMOGLOBIN GLYCOSYLATED A1C: CPT | Performed by: NURSE PRACTITIONER

## 2023-05-09 PROCEDURE — 84443 ASSAY THYROID STIM HORMONE: CPT | Performed by: NURSE PRACTITIONER

## 2023-05-11 DIAGNOSIS — R31.9 URINARY TRACT INFECTION WITH HEMATURIA, SITE UNSPECIFIED: Primary | ICD-10-CM

## 2023-05-11 DIAGNOSIS — N39.0 URINARY TRACT INFECTION WITH HEMATURIA, SITE UNSPECIFIED: Primary | ICD-10-CM

## 2023-05-11 LAB — BACTERIA SPEC AEROBE CULT: ABNORMAL

## 2023-05-11 RX ORDER — NITROFURANTOIN 25; 75 MG/1; MG/1
100 CAPSULE ORAL 2 TIMES DAILY
Qty: 10 CAPSULE | Refills: 0 | Status: SHIPPED | OUTPATIENT
Start: 2023-05-11

## 2023-09-26 ENCOUNTER — OFFICE VISIT (OUTPATIENT)
Dept: SURGERY | Facility: CLINIC | Age: 58
End: 2023-09-26
Payer: COMMERCIAL

## 2023-09-26 ENCOUNTER — PREP FOR SURGERY (OUTPATIENT)
Dept: OTHER | Facility: HOSPITAL | Age: 58
End: 2023-09-26
Payer: COMMERCIAL

## 2023-09-26 VITALS
WEIGHT: 208.2 LBS | SYSTOLIC BLOOD PRESSURE: 148 MMHG | HEIGHT: 67 IN | HEART RATE: 90 BPM | DIASTOLIC BLOOD PRESSURE: 83 MMHG | BODY MASS INDEX: 32.68 KG/M2

## 2023-09-26 DIAGNOSIS — Z86.010 HISTORY OF COLONIC POLYPS: ICD-10-CM

## 2023-09-26 DIAGNOSIS — Z12.11 SCREENING FOR MALIGNANT NEOPLASM OF COLON: Primary | ICD-10-CM

## 2023-09-26 PROBLEM — M41.9 SCOLIOSIS: Status: ACTIVE | Noted: 2023-09-26

## 2023-09-26 PROBLEM — N60.92 ATYPICAL LOBULAR HYPERPLASIA (ALH) OF LEFT BREAST: Status: ACTIVE | Noted: 2017-09-19

## 2023-09-26 PROBLEM — Z79.810 USE OF TAMOXIFEN (NOLVADEX): Status: ACTIVE | Noted: 2019-12-19

## 2023-09-26 PROBLEM — Z86.0100 HISTORY OF COLONIC POLYPS: Status: ACTIVE | Noted: 2023-09-26

## 2023-09-26 PROCEDURE — S0285 CNSLT BEFORE SCREEN COLONOSC: HCPCS | Performed by: NURSE PRACTITIONER

## 2023-09-26 RX ORDER — SODIUM CHLORIDE 0.9 % (FLUSH) 0.9 %
10 SYRINGE (ML) INJECTION AS NEEDED
OUTPATIENT
Start: 2023-09-26

## 2023-09-26 RX ORDER — SODIUM CHLORIDE 0.9 % (FLUSH) 0.9 %
3 SYRINGE (ML) INJECTION EVERY 12 HOURS SCHEDULED
OUTPATIENT
Start: 2023-09-26

## 2023-09-26 RX ORDER — SODIUM PICOSULFATE, MAGNESIUM OXIDE, AND ANHYDROUS CITRIC ACID 12; 3.5; 1 G/175ML; G/175ML; MG/175ML
2 LIQUID ORAL ONCE
Qty: 175 ML | Refills: 0 | Status: SHIPPED | OUTPATIENT
Start: 2023-09-26 | End: 2023-09-26

## 2023-09-26 RX ORDER — SODIUM CHLORIDE 9 MG/ML
40 INJECTION, SOLUTION INTRAVENOUS AS NEEDED
OUTPATIENT
Start: 2023-09-26

## 2023-09-26 NOTE — PROGRESS NOTES
Chief Complaint: Colonoscopy (Consult)    Subjective      Colonoscopy consultation       History of Present Illness  Vi Cruz is a 57 y.o. female presents to [facility] for colonoscopy consultation.    Patient presents today on referral from Silvia Whitehead for colonoscopy consultation.  Patient denies any abdominal pain, change in bowel, or rectal bleeding.  Denies any family history of colorectal cancer.  Admits to history of colonic polyps.    12/17: colonoscopy ([facility]): Ascending - hyperplastic    Objective     History reviewed. No pertinent past medical history.    Past Surgical History:   Procedure Laterality Date    ANKLE ARTHROSCOPY Left     HYSTERECTOMY      SHOULDER ARTHROSCOPY W/ ROTATOR CUFF REPAIR Left        Outpatient Medications Marked as Taking for the 9/26/23 encounter (Office Visit) with Jeanine Harrell, KEAYNNA   Medication Sig Dispense Refill    Biotin 10 MG chewable tablet Chew.      cholecalciferol (VITAMIN D3) 10 MCG (400 UNIT) tablet Take  by mouth Daily.      cyanocobalamin (VITAMIN B-12) 1000 MCG tablet Take  by mouth Daily.      melatonin 5 MG tablet tablet Take 1 tablet by mouth.      Omega-3 Fatty Acids (fish oil) 1000 MG capsule capsule Take  by mouth.         Allergies   Allergen Reactions    Milk-Related Compounds Other (See Comments)     GI UPSET     Milk (Cow) GI Intolerance    Adhesive Tape Rash    Other Itching     All METALS per patient        Family History   Problem Relation Age of Onset    Diabetes Father     Cancer Father         prostate and bladder    Diabetes Brother     Cancer Brother         prostate    Diabetes Brother     Cancer Brother         testicular    Diabetes Brother        Social History     Socioeconomic History    Marital status:    Tobacco Use    Smoking status: Never    Smokeless tobacco: Never   Vaping Use    Vaping Use: Never used   Substance and Sexual Activity    Alcohol use: Yes     Comment: social  "      Review of Systems   Constitutional:  Negative for chills and fever.   Gastrointestinal:  Negative for abdominal distention, abdominal pain, anal bleeding, blood in stool, constipation, diarrhea and rectal pain.      Vital Signs:   /83   Pulse 90   Ht 168.9 cm (66.5\")   Wt 94.4 kg (208 lb 3.2 oz)   BMI 33.10 kg/m²      Physical Exam  Vitals and nursing note reviewed.   Constitutional:       General: She is not in acute distress.     Appearance: Normal appearance.   HENT:      Head: Normocephalic.   Cardiovascular:      Rate and Rhythm: Normal rate.   Pulmonary:      Effort: Pulmonary effort is normal.   Abdominal:      Palpations: Abdomen is soft.      Tenderness: There is no guarding.   Musculoskeletal:         General: No deformity. Normal range of motion.   Skin:     General: Skin is warm and dry.      Coloration: Skin is not jaundiced.   Neurological:      General: No focal deficit present.      Mental Status: She is alert and oriented to person, place, and time.   Psychiatric:         Mood and Affect: Mood normal.         Thought Content: Thought content normal.        Result Review :          []  Laboratory  []  Radiology  []  Pathology  []  Microbiology  []  EKG/Telemetry   []  Cardiology/Vascular   []  Old records  I spent 15 minutes caring for Vi on this date of service. This time includes time spent by me in the following activities: reviewing tests, obtaining and/or reviewing a separately obtained history, performing a medically appropriate examination and/or evaluation, ordering medications, tests, or procedures, and documenting information in the medical record.     Assessment and Plan    Diagnoses and all orders for this visit:    1. Screening for malignant neoplasm of colon (Primary)    2. History of colonic polyps    Other orders  -     Sod Picosulfate-Mag Ox-Cit Acd (Clenpiq) 10-3.5-12 MG-GM -GM/175ML solution; Take 2 bottles by mouth 1 (One) Time for 1 dose.  Dispense: 175 mL; " Refill: 0        Follow Up   Return for Schedule colonoscopy Dr. Merchant on 2/29/2024 [facility].    Hospital arrival time: 0800.    Possible risks/complications, benefits, and alternatives to surgical or invasive procedures have been explained to patient and/or legal guardian.    Patient has been evaluated and can tolerate anesthesia and/or sedation. Risks, benefits, and alternatives to anesthesia and sedation have been explained to the patient and/or legal guardian. Patient verbalizes understanding and is willing to proceed with the above plan.     Patient was given instructions and counseling regarding her condition or for health maintenance advice. Please see specific information pulled into the AVS if appropriate.

## 2024-02-20 ENCOUNTER — OFFICE VISIT (OUTPATIENT)
Dept: FAMILY MEDICINE CLINIC | Facility: CLINIC | Age: 59
End: 2024-02-20
Payer: COMMERCIAL

## 2024-02-20 VITALS
DIASTOLIC BLOOD PRESSURE: 76 MMHG | HEIGHT: 67 IN | BODY MASS INDEX: 32.8 KG/M2 | WEIGHT: 209 LBS | TEMPERATURE: 98.3 F | OXYGEN SATURATION: 98 % | HEART RATE: 79 BPM | SYSTOLIC BLOOD PRESSURE: 138 MMHG

## 2024-02-20 DIAGNOSIS — N60.92 ATYPICAL LOBULAR HYPERPLASIA (ALH) OF LEFT BREAST: ICD-10-CM

## 2024-02-20 DIAGNOSIS — N64.4 BREAST PAIN: ICD-10-CM

## 2024-02-20 DIAGNOSIS — R53.83 FATIGUE, UNSPECIFIED TYPE: ICD-10-CM

## 2024-02-20 DIAGNOSIS — Z71.85 VACCINE COUNSELING: ICD-10-CM

## 2024-02-20 DIAGNOSIS — Z00.00 WELL WOMAN EXAM WITHOUT GYNECOLOGICAL EXAM: Primary | ICD-10-CM

## 2024-02-20 DIAGNOSIS — R73.03 PREDIABETES: ICD-10-CM

## 2024-02-20 DIAGNOSIS — F43.29 GRIEF REACTION WITH PROLONGED BEREAVEMENT: ICD-10-CM

## 2024-02-20 RX ORDER — VENLAFAXINE HYDROCHLORIDE 37.5 MG/1
37.5 CAPSULE, EXTENDED RELEASE ORAL DAILY
Qty: 90 CAPSULE | Refills: 1 | Status: SHIPPED | OUTPATIENT
Start: 2024-02-20

## 2024-02-20 NOTE — PROGRESS NOTES
Chief Complaint  Wellness Check    Subjective            Vi Cruz presents to [facility]  History of Present Illness  Patient is here today for her annual wellness check and fasting labs without Pap smear--prior Hx SHANELLE amd R/T fibroid     And also with the DX of atypical lobular hyperplasia left breast and had lumpectomy and did 5 years of tamoxifen as well--and patient reports normally every 6 months alternating she will have the diagnostic mammogram and then 6 months later she will have the MRI of the breasts and then 6 months later back to the mammogram and then 6 months later back to the MRI-and she is actually due for the MRI and is no longer seeing her provider in [place] as everything has been stable and she is off of the tamoxifen now but would like to proceed with getting this ordered and she has been having some left breast tenderness but no palpable lumps    Also reports that in the past she was on the SNRI Effexor XR 37.5 mg daily and she did very well on that in the past and she lost both of her parents less than 6 weeks apart last year and she still grieving and feels overwhelmed at times tearful at times said and may be a little depressed and some anxiety and was wondering if restarting this might be beneficial denies all SI/HI    Also is going next month for her colonoscopy    Also would like to go ahead and proceed with the updated Tdap today    And then order the labs fasting and she will get these done at the hospital          PHQ-2 Total Score: 8  PHQ-9 Total Score: 8    History reviewed. No pertinent past medical history.    Allergies   Allergen Reactions    Milk-Related Compounds Other (See Comments)     GI UPSET     Milk (Cow) GI Intolerance    Adhesive Tape Rash    Other Itching     All METALS per patient        Past Surgical History:   Procedure Laterality Date    ANKLE ARTHROSCOPY Left     HYSTERECTOMY      SHOULDER ARTHROSCOPY W/ ROTATOR CUFF REPAIR  Left         Social History     Tobacco Use    Smoking status: Never    Smokeless tobacco: Never   Vaping Use    Vaping Use: Never used   Substance Use Topics    Alcohol use: Yes     Comment: social       Family History   Problem Relation Age of Onset    Diabetes Father     Cancer Father         prostate and bladder    Diabetes Brother     Cancer Brother         prostate    Diabetes Brother     Cancer Brother         testicular    Diabetes Brother         Health Maintenance Due   Topic Date Due    COLORECTAL CANCER SCREENING  12/20/2022    COVID-19 Vaccine (2 - 2023-24 season) 09/01/2023        Current Outpatient Medications on File Prior to Visit   Medication Sig    Biotin 10 MG chewable tablet Chew.    cholecalciferol (VITAMIN D3) 10 MCG (400 UNIT) tablet Take  by mouth Daily.    cyanocobalamin (VITAMIN B-12) 1000 MCG tablet Take  by mouth Daily.    Omega-3 Fatty Acids (fish oil) 1000 MG capsule capsule Take  by mouth.    [DISCONTINUED] melatonin 5 MG tablet tablet Take 1 tablet by mouth.    [DISCONTINUED] nitrofurantoin, macrocrystal-monohydrate, (Macrobid) 100 MG capsule Take 1 capsule by mouth 2 (Two) Times a Day. (Patient not taking: Reported on 9/26/2023)     No current facility-administered medications on file prior to visit.       Immunization History   Administered Date(s) Administered    COVID-19 (UNSPECIFIED) 01/16/2021    Fluzone (or Fluarix & Flulaval for VFC) >6mos 10/30/2023    Shingrix 04/25/2021, 08/19/2021    Tdap 02/20/2024       Review of Systems   Constitutional:  Positive for fatigue.   HENT:  Negative for trouble swallowing.    Eyes:  Negative for blurred vision and double vision.   Respiratory:  Negative for choking and shortness of breath.    Cardiovascular:  Negative for chest pain.   Gastrointestinal:  Negative for abdominal pain and blood in stool.        Cscope next week    Endocrine: Negative for polydipsia, polyphagia and polyuria.   Genitourinary:  Positive for breast pain. Negative  "for breast discharge, breast lump, dysuria and vaginal bleeding.        Breast pain to left breast --started approx 3-4 months    Musculoskeletal:  Positive for back pain and neck pain.        Chronic Hx of scoliosis    Neurological:  Negative for dizziness, seizures, syncope and light-headedness.   Hematological:  Bruises/bleeds easily.   Psychiatric/Behavioral:  Positive for sleep disturbance and depressed mood. Negative for self-injury and suicidal ideas. The patient is nervous/anxious.         Lost her parents last yr         Objective     /76   Pulse 79   Temp 98.3 °F (36.8 °C) (Temporal)   Ht 168.9 cm (66.5\")   Wt 94.8 kg (209 lb)   SpO2 98%   BMI 33.23 kg/m²       Physical Exam  Vitals and nursing note reviewed.   Constitutional:       Appearance: Normal appearance.   HENT:      Head: Normocephalic.      Right Ear: External ear normal.      Left Ear: External ear normal.      Nose: Nose normal.      Mouth/Throat:      Mouth: Mucous membranes are moist.   Eyes:      Pupils: Pupils are equal, round, and reactive to light.   Cardiovascular:      Rate and Rhythm: Normal rate and regular rhythm.      Heart sounds: Normal heart sounds.   Pulmonary:      Effort: Pulmonary effort is normal.      Breath sounds: Normal breath sounds.   Abdominal:      Palpations: Abdomen is soft.   Musculoskeletal:         General: Normal range of motion.      Cervical back: Normal range of motion and neck supple.   Skin:     General: Skin is warm and dry.   Neurological:      Mental Status: She is alert and oriented to person, place, and time.   Psychiatric:         Mood and Affect: Mood normal.         Behavior: Behavior normal.         Thought Content: Thought content normal.         Judgment: Judgment normal.      Comments: Mildly tearful at times         Result Review :                           Assessment and Plan      Diagnoses and all orders for this visit:    1. Well woman exam without gynecological exam " (Primary)  -     CBC & Differential  -     Comprehensive Metabolic Panel  -     Lipid Panel  -     TSH Rfx On Abnormal To Free T4  -     Urinalysis With Culture If Indicated -  -     Hemoglobin A1c  -     Vitamin D,25-Hydroxy  -     Vitamin B12 & Folate  -     Tdap Vaccine => 8yo IM (BOOSTRIX)    2. Atypical lobular hyperplasia (ALH) of left breast  -     MRI Breast Bilateral Screening With & Without Contrast; Future  -     CBC & Differential  -     Comprehensive Metabolic Panel    3. Prediabetes  -     Hemoglobin A1c    4. Fatigue, unspecified type  -     CBC & Differential  -     Comprehensive Metabolic Panel  -     Lipid Panel  -     TSH Rfx On Abnormal To Free T4  -     Urinalysis With Culture If Indicated -  -     Hemoglobin A1c  -     Vitamin D,25-Hydroxy  -     Vitamin B12 & Folate    5. Breast pain  -     MRI Breast Bilateral Screening With & Without Contrast; Future    6. Grief reaction with prolonged bereavement  -     venlafaxine XR (Effexor XR) 37.5 MG 24 hr capsule; Take 1 capsule by mouth Daily.  Dispense: 90 capsule; Refill: 1    7. 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    Patient was given instructions and counseling regarding her condition or for health maintenance advice. Please see specific information pulled into the AVS if appropriate.            Vi Cruz  reports that she has never smoked. She has never used smokeless  tobacco.. I have educated her on the risk of diseases from using tobacco products such as cancer, COPD, and heart disease.

## 2024-02-28 ENCOUNTER — ANESTHESIA EVENT (OUTPATIENT)
Dept: GASTROENTEROLOGY | Facility: HOSPITAL | Age: 59
End: 2024-02-28
Payer: COMMERCIAL

## 2024-02-28 NOTE — ANESTHESIA PREPROCEDURE EVALUATION
Anesthesia Evaluation     Patient summary reviewed and Nursing notes reviewed   no history of anesthetic complications:   NPO Solid Status: > 8 hours  NPO Liquid Status: > 2 hours           Airway   Mallampati: II  TM distance: >3 FB  Neck ROM: full  No difficulty expected  Dental          Pulmonary - negative pulmonary ROS and normal exam    breath sounds clear to auscultation  Cardiovascular - normal exam  Exercise tolerance: good (4-7 METS)    Rhythm: regular  Rate: normal        Neuro/Psych  (+) psychiatric history Anxiety and Depression  GI/Hepatic/Renal/Endo    (+) obesity    Musculoskeletal     (+) back pain  Abdominal   (+) obese    Abdomen: soft.  Bowel sounds: normal.   Substance History - negative use     OB/GYN          Other - negative ROS       ROS/Med Hx Other: Screening               Anesthesia Plan    ASA 2     general   total IV anesthesia  (Total IV Anesthesia    Patient understands anesthesia not responsible for dental damage.  )  intravenous induction     Anesthetic plan, risks, benefits, and alternatives have been provided, discussed and informed consent has been obtained with: patient.  Pre-procedure education provided  Use of blood products discussed with patient  Did not consent to blood products.    Plan discussed with CRNA.    CODE STATUS:

## 2024-02-29 ENCOUNTER — ANESTHESIA (OUTPATIENT)
Dept: GASTROENTEROLOGY | Facility: HOSPITAL | Age: 59
End: 2024-02-29
Payer: COMMERCIAL

## 2024-02-29 ENCOUNTER — HOSPITAL ENCOUNTER (OUTPATIENT)
Facility: HOSPITAL | Age: 59
Setting detail: HOSPITAL OUTPATIENT SURGERY
Discharge: HOME | End: 2024-02-29
Attending: SURGERY | Admitting: SURGERY
Payer: COMMERCIAL

## 2024-02-29 VITALS
TEMPERATURE: 98.3 F | RESPIRATION RATE: 13 BRPM | DIASTOLIC BLOOD PRESSURE: 88 MMHG | BODY MASS INDEX: 32.95 KG/M2 | WEIGHT: 207.23 LBS | OXYGEN SATURATION: 95 % | SYSTOLIC BLOOD PRESSURE: 131 MMHG | HEART RATE: 69 BPM

## 2024-02-29 DIAGNOSIS — Z86.010 HISTORY OF COLONIC POLYPS: ICD-10-CM

## 2024-02-29 DIAGNOSIS — Z12.11 SCREENING FOR MALIGNANT NEOPLASM OF COLON: ICD-10-CM

## 2024-02-29 PROCEDURE — 25010000002 PROPOFOL 10 MG/ML EMULSION: Performed by: NURSE ANESTHETIST, CERTIFIED REGISTERED

## 2024-02-29 PROCEDURE — 25810000003 LACTATED RINGERS PER 1000 ML

## 2024-02-29 RX ORDER — SODIUM CHLORIDE 9 MG/ML
40 INJECTION, SOLUTION INTRAVENOUS AS NEEDED
Status: DISCONTINUED | OUTPATIENT
Start: 2024-02-29 | End: 2024-02-29 | Stop reason: HOSPADM

## 2024-02-29 RX ORDER — LIDOCAINE HYDROCHLORIDE 20 MG/ML
INJECTION, SOLUTION EPIDURAL; INFILTRATION; INTRACAUDAL; PERINEURAL AS NEEDED
Status: DISCONTINUED | OUTPATIENT
Start: 2024-02-29 | End: 2024-02-29 | Stop reason: SURG

## 2024-02-29 RX ORDER — PROPOFOL 10 MG/ML
VIAL (ML) INTRAVENOUS AS NEEDED
Status: DISCONTINUED | OUTPATIENT
Start: 2024-02-29 | End: 2024-02-29 | Stop reason: SURG

## 2024-02-29 RX ORDER — SODIUM CHLORIDE 0.9 % (FLUSH) 0.9 %
10 SYRINGE (ML) INJECTION AS NEEDED
Status: DISCONTINUED | OUTPATIENT
Start: 2024-02-29 | End: 2024-02-29 | Stop reason: HOSPADM

## 2024-02-29 RX ORDER — SODIUM CHLORIDE, SODIUM LACTATE, POTASSIUM CHLORIDE, CALCIUM CHLORIDE 600; 310; 30; 20 MG/100ML; MG/100ML; MG/100ML; MG/100ML
30 INJECTION, SOLUTION INTRAVENOUS CONTINUOUS
Status: DISCONTINUED | OUTPATIENT
Start: 2024-02-29 | End: 2024-02-29 | Stop reason: HOSPADM

## 2024-02-29 RX ORDER — SODIUM CHLORIDE 0.9 % (FLUSH) 0.9 %
3 SYRINGE (ML) INJECTION EVERY 12 HOURS SCHEDULED
Status: DISCONTINUED | OUTPATIENT
Start: 2024-02-29 | End: 2024-02-29 | Stop reason: HOSPADM

## 2024-02-29 RX ADMIN — PROPOFOL 165 MCG/KG/MIN: 10 INJECTION, EMULSION INTRAVENOUS at 08:25

## 2024-02-29 RX ADMIN — LIDOCAINE HYDROCHLORIDE 60 MG: 20 INJECTION, SOLUTION EPIDURAL; INFILTRATION; INTRACAUDAL; PERINEURAL at 08:25

## 2024-02-29 RX ADMIN — PROPOFOL 100 MG: 10 INJECTION, EMULSION INTRAVENOUS at 08:25

## 2024-02-29 RX ADMIN — SODIUM CHLORIDE, POTASSIUM CHLORIDE, SODIUM LACTATE AND CALCIUM CHLORIDE 30 ML/HR: 600; 310; 30; 20 INJECTION, SOLUTION INTRAVENOUS at 08:17

## 2024-02-29 NOTE — NURSING NOTE
ABDOMINAL PRESSURE APPLIED PER MD INSTRUCTION TO ASSIST WITH ADVANCEMENT OF SCOPE  Annette Delgadillo RN

## 2024-02-29 NOTE — H&P
General Surgery/Colorectal Surgery Note    Patient Name:  Vi Cruz  YOB: 1965  [id number]    Referring Provider: Selwyn Merchant, *      Patient Care Team:  Gilma Espinoza APRN as PCP - General (Nurse Practitioner)  Jeanine Harrell APRN as Nurse Practitioner (Nurse Practitioner)    Subjective .     History of present illness:    HPI   referral from Silvia Whitehead for colonoscopy consultation.  Patient denies any abdominal pain, change in bowel, or rectal bleeding.  Denies any family history of colorectal cancer.  Admits to history of colonic polyps.     12/17: colonoscopy ([facility]): Ascending - hyperplastic    History:  No past medical history on file.    Past Surgical History:   Procedure Laterality Date    ANKLE ARTHROSCOPY Left     HYSTERECTOMY      SHOULDER ARTHROSCOPY W/ ROTATOR CUFF REPAIR Left        Family History   Problem Relation Age of Onset    Diabetes Father     Cancer Father         prostate and bladder    Diabetes Brother     Cancer Brother         prostate    Diabetes Brother     Cancer Brother         testicular    Diabetes Brother        Social History     Tobacco Use    Smoking status: Never    Smokeless tobacco: Never   Vaping Use    Vaping Use: Never used   Substance Use Topics    Alcohol use: Yes     Comment: social       Review of Systems: See HPI    Review of Systems            Medications Prior to Admission   Medication Sig Dispense Refill Last Dose    Biotin 10 MG chewable tablet Chew.       cholecalciferol (VITAMIN D3) 10 MCG (400 UNIT) tablet Take  by mouth Daily.       cyanocobalamin (VITAMIN B-12) 1000 MCG tablet Take  by mouth Daily.       Omega-3 Fatty Acids (fish oil) 1000 MG capsule capsule Take  by mouth.       venlafaxine XR (Effexor XR) 37.5 MG 24 hr capsule Take 1 capsule by mouth Daily. 90 capsule 1          Home Meds:      Prior to Admission medications    Medication Sig Start Date End Date Taking? Authorizing Provider   Biotin 10 MG  chewable tablet Chew.    Provider, MD Johana   cholecalciferol (VITAMIN D3) 10 MCG (400 UNIT) tablet Take  by mouth Daily.    Emergency, Nurse Cathy, RN   cyanocobalamin (VITAMIN B-12) 1000 MCG tablet Take  by mouth Daily.    Emergency, Nurse Cathy, RN   Omega-3 Fatty Acids (fish oil) 1000 MG capsule capsule Take  by mouth.    Emergency, Nurse Cathy, RN   venlafaxine XR (Effexor XR) 37.5 MG 24 hr capsule Take 1 capsule by mouth Daily. 2/20/24   Gilma Espinoza, KEYANNA            Allergies:  Milk-related compounds, Milk (cow), Adhesive tape, and Other      Objective     Vital Signs        Physical Exam:     Physical Exam    NAD, A/O x 4, normal circulation, normal respiration      Result Review :     Assessment & Plan     There are no diagnoses linked to this encounter.       Risks including bleeding, perforation, pain, infection, missed polyp(s). Benefits, and alternatives of Colonoscopy discussed with patient.  All questions answered.  Consent verified.         Selwyn Merchant MD  02/29/24 07:54 EST

## 2024-02-29 NOTE — ANESTHESIA POSTPROCEDURE EVALUATION
Patient: Vi Cruz    Procedure Summary       Date: 02/29/24 Room / Location: [facility] ENDOSCOPY 2 / [facility]    Anesthesia Start: 0823 Anesthesia Stop: 0843    Procedure: COLONOSCOPY Diagnosis:       Screening for malignant neoplasm of colon      History of colonic polyps      (Screening for malignant neoplasm of colon [Z12.11])      (History of colonic polyps [Z86.010])    Surgeons: Selwyn Merchant MD Provider: Alex Cheek CRNA    Anesthesia Type: general ASA Status: 2            Anesthesia Type: general    Vitals  Vitals Value Taken Time   /88 02/29/24 0857   Temp 36.8 °C (98.3 °F) 02/29/24 0857   Pulse 73 02/29/24 0858   Resp 13 02/29/24 0857   SpO2 96 % 02/29/24 0858   Vitals shown include unfiled device data.        Post Anesthesia Care and Evaluation    Post-procedure mental status: acceptable.  Pain management: satisfactory to patient    Airway patency: patent  Anesthetic complications: No anesthetic complications    Cardiovascular status: acceptable  Respiratory status: acceptable    Comments: Per chart review

## 2024-03-04 ENCOUNTER — TELEPHONE (OUTPATIENT)
Dept: FAMILY MEDICINE CLINIC | Facility: CLINIC | Age: 59
End: 2024-03-04
Payer: COMMERCIAL

## 2024-03-04 NOTE — TELEPHONE ENCOUNTER
Caller: Vi Cruz    Relationship: Self    Best call back number: [phone]     Who are you requesting to speak with (clinical staff, provider,  specific staff member): MEDICAL STAFF    What was the call regarding: PATIENT SPOKE WITH SOMEONE IN THE OFFICE LAST WEEK REGARDING THE PRIOR AUTHORIZATION FOR A BREAST MRI. SHE WOULD LIKE TO SPEAK WITH THEM AGAIN. SHE SPOKE WITH HER INSURANCE AND WANTED TO TOUCH BASE WITH THE PERSON SHE SPOKE WITH LAST WEEK. SHE IS NOT SURE WHO SHE SPOKE WITH. PLEASE CALL TO ADVISE.

## 2024-03-12 ENCOUNTER — APPOINTMENT (OUTPATIENT)
Dept: MRI IMAGING | Facility: HOSPITAL | Age: 59
End: 2024-03-12
Payer: COMMERCIAL

## 2024-03-22 ENCOUNTER — TELEPHONE (OUTPATIENT)
Dept: FAMILY MEDICINE CLINIC | Facility: CLINIC | Age: 59
End: 2024-03-22
Payer: COMMERCIAL

## 2024-03-22 NOTE — TELEPHONE ENCOUNTER
Caller: BREANA    Relationship: INSURANCE    Best call back number: [phone]    What was the call regarding: THE AUTHORIZATION NUMBER FOR THE PATIENTS MRI OF BREAST ORDERS IS:     [id number] - FROM 2-24 TO 09/18/24 AT [facility]

## 2024-04-03 ENCOUNTER — APPOINTMENT (OUTPATIENT)
Dept: MRI IMAGING | Facility: HOSPITAL | Age: 59
End: 2024-04-03
Payer: COMMERCIAL

## 2024-04-16 ENCOUNTER — HOSPITAL ENCOUNTER (OUTPATIENT)
Dept: MRI IMAGING | Facility: HOSPITAL | Age: 59
Discharge: HOME | End: 2024-04-16
Admitting: NURSE PRACTITIONER
Payer: COMMERCIAL

## 2024-04-16 DIAGNOSIS — N60.92 ATYPICAL LOBULAR HYPERPLASIA (ALH) OF LEFT BREAST: ICD-10-CM

## 2024-04-16 DIAGNOSIS — N64.4 BREAST PAIN: ICD-10-CM

## 2024-04-16 PROCEDURE — 0 GADOBENATE DIMEGLUMINE 529 MG/ML SOLUTION: Performed by: NURSE PRACTITIONER

## 2024-04-16 PROCEDURE — A9577 INJ MULTIHANCE: HCPCS | Performed by: NURSE PRACTITIONER

## 2024-04-16 PROCEDURE — 77049 MRI BREAST C-+ W/CAD BI: CPT

## 2024-04-16 RX ADMIN — GADOBENATE DIMEGLUMINE 20 ML: 529 INJECTION, SOLUTION INTRAVENOUS at 10:59

## 2024-04-17 ENCOUNTER — HOSPITAL ENCOUNTER (OUTPATIENT)
Dept: OTHER | Facility: HOSPITAL | Age: 59
Discharge: HOME | End: 2024-04-17

## 2024-05-08 ENCOUNTER — LAB (OUTPATIENT)
Dept: LAB | Facility: HOSPITAL | Age: 59
End: 2024-05-08
Payer: COMMERCIAL

## 2024-05-08 LAB
25(OH)D3 SERPL-MCNC: 91.6 NG/ML (ref 30–100)
ALBUMIN SERPL-MCNC: 4.8 G/DL (ref 3.5–5.2)
ALBUMIN/GLOB SERPL: 1.9 G/DL
ALP SERPL-CCNC: 62 U/L (ref 39–117)
ALT SERPL W P-5'-P-CCNC: 18 U/L (ref 1–33)
ANION GAP SERPL CALCULATED.3IONS-SCNC: 9 MMOL/L (ref 5–15)
AST SERPL-CCNC: 21 U/L (ref 1–32)
BASOPHILS # BLD AUTO: 0.05 10*3/MM3 (ref 0–0.2)
BASOPHILS NFR BLD AUTO: 1.1 % (ref 0–1.5)
BILIRUB SERPL-MCNC: 0.5 MG/DL (ref 0–1.2)
BILIRUB UR QL STRIP: NEGATIVE
BUN SERPL-MCNC: 14 MG/DL (ref 6–20)
BUN/CREAT SERPL: 15.2 (ref 7–25)
CALCIUM SPEC-SCNC: 9.8 MG/DL (ref 8.6–10.5)
CHLORIDE SERPL-SCNC: 103 MMOL/L (ref 98–107)
CHOLEST SERPL-MCNC: 245 MG/DL (ref 0–200)
CLARITY UR: CLEAR
CO2 SERPL-SCNC: 29 MMOL/L (ref 22–29)
COLOR UR: ABNORMAL
CREAT SERPL-MCNC: 0.92 MG/DL (ref 0.57–1)
DEPRECATED RDW RBC AUTO: 39.9 FL (ref 37–54)
EGFRCR SERPLBLD CKD-EPI 2021: 72.8 ML/MIN/1.73
EOSINOPHIL # BLD AUTO: 0.13 10*3/MM3 (ref 0–0.4)
EOSINOPHIL NFR BLD AUTO: 3 % (ref 0.3–6.2)
ERYTHROCYTE [DISTWIDTH] IN BLOOD BY AUTOMATED COUNT: 12.3 % (ref 12.3–15.4)
FOLATE SERPL-MCNC: >20 NG/ML (ref 4.78–24.2)
GLOBULIN UR ELPH-MCNC: 2.5 GM/DL
GLUCOSE SERPL-MCNC: 102 MG/DL (ref 65–99)
GLUCOSE UR STRIP-MCNC: NEGATIVE MG/DL
HBA1C MFR BLD: 5.9 % (ref 4.8–5.6)
HCT VFR BLD AUTO: 40.6 % (ref 34–46.6)
HDLC SERPL-MCNC: 100 MG/DL (ref 40–60)
HGB BLD-MCNC: 13.4 G/DL (ref 12–15.9)
HGB UR QL STRIP.AUTO: NEGATIVE
HOLD SPECIMEN: NORMAL
IMM GRANULOCYTES # BLD AUTO: 0.03 10*3/MM3 (ref 0–0.05)
IMM GRANULOCYTES NFR BLD AUTO: 0.7 % (ref 0–0.5)
KETONES UR QL STRIP: NEGATIVE
LDLC SERPL CALC-MCNC: 131 MG/DL (ref 0–100)
LDLC/HDLC SERPL: 1.28 {RATIO}
LEUKOCYTE ESTERASE UR QL STRIP.AUTO: NEGATIVE
LYMPHOCYTES # BLD AUTO: 1.47 10*3/MM3 (ref 0.7–3.1)
LYMPHOCYTES NFR BLD AUTO: 33.6 % (ref 19.6–45.3)
MCH RBC QN AUTO: 29.8 PG (ref 26.6–33)
MCHC RBC AUTO-ENTMCNC: 33 G/DL (ref 31.5–35.7)
MCV RBC AUTO: 90.4 FL (ref 79–97)
MONOCYTES # BLD AUTO: 0.42 10*3/MM3 (ref 0.1–0.9)
MONOCYTES NFR BLD AUTO: 9.6 % (ref 5–12)
NEUTROPHILS NFR BLD AUTO: 2.27 10*3/MM3 (ref 1.7–7)
NEUTROPHILS NFR BLD AUTO: 52 % (ref 42.7–76)
NITRITE UR QL STRIP: NEGATIVE
NRBC BLD AUTO-RTO: 0 /100 WBC (ref 0–0.2)
PH UR STRIP.AUTO: 6 [PH] (ref 5–8)
PLATELET # BLD AUTO: 258 10*3/MM3 (ref 140–450)
PMV BLD AUTO: 10.3 FL (ref 6–12)
POTASSIUM SERPL-SCNC: 5 MMOL/L (ref 3.5–5.2)
PROT SERPL-MCNC: 7.3 G/DL (ref 6–8.5)
PROT UR QL STRIP: NEGATIVE
RBC # BLD AUTO: 4.49 10*6/MM3 (ref 3.77–5.28)
SODIUM SERPL-SCNC: 141 MMOL/L (ref 136–145)
SP GR UR STRIP: 1.02 (ref 1–1.03)
TRIGL SERPL-MCNC: 86 MG/DL (ref 0–150)
TSH SERPL DL<=0.05 MIU/L-ACNC: 1.47 UIU/ML (ref 0.27–4.2)
UROBILINOGEN UR QL STRIP: ABNORMAL
VIT B12 BLD-MCNC: 735 PG/ML (ref 211–946)
VLDLC SERPL-MCNC: 14 MG/DL (ref 5–40)
WBC NRBC COR # BLD AUTO: 4.37 10*3/MM3 (ref 3.4–10.8)

## 2024-05-08 PROCEDURE — 82746 ASSAY OF FOLIC ACID SERUM: CPT | Performed by: NURSE PRACTITIONER

## 2024-05-08 PROCEDURE — 84443 ASSAY THYROID STIM HORMONE: CPT | Performed by: NURSE PRACTITIONER

## 2024-05-08 PROCEDURE — 81003 URINALYSIS AUTO W/O SCOPE: CPT | Performed by: NURSE PRACTITIONER

## 2024-05-08 PROCEDURE — 85025 COMPLETE CBC W/AUTO DIFF WBC: CPT | Performed by: NURSE PRACTITIONER

## 2024-05-08 PROCEDURE — 80061 LIPID PANEL: CPT | Performed by: NURSE PRACTITIONER

## 2024-05-08 PROCEDURE — 80053 COMPREHEN METABOLIC PANEL: CPT | Performed by: NURSE PRACTITIONER

## 2024-05-08 PROCEDURE — 82306 VITAMIN D 25 HYDROXY: CPT | Performed by: NURSE PRACTITIONER

## 2024-05-08 PROCEDURE — 83036 HEMOGLOBIN GLYCOSYLATED A1C: CPT | Performed by: NURSE PRACTITIONER

## 2024-05-08 PROCEDURE — 82607 VITAMIN B-12: CPT | Performed by: NURSE PRACTITIONER

## 2024-09-27 DIAGNOSIS — F43.29 GRIEF REACTION WITH PROLONGED BEREAVEMENT: ICD-10-CM

## 2024-09-27 RX ORDER — VENLAFAXINE HYDROCHLORIDE 37.5 MG/1
37.5 CAPSULE, EXTENDED RELEASE ORAL DAILY
Qty: 30 CAPSULE | Refills: 0 | Status: SHIPPED | OUTPATIENT
Start: 2024-09-27

## 2024-10-07 ENCOUNTER — OFFICE VISIT (OUTPATIENT)
Dept: FAMILY MEDICINE CLINIC | Facility: CLINIC | Age: 59
End: 2024-10-07
Payer: COMMERCIAL

## 2024-10-07 VITALS
TEMPERATURE: 97.1 F | BODY MASS INDEX: 31.55 KG/M2 | SYSTOLIC BLOOD PRESSURE: 136 MMHG | HEIGHT: 67 IN | WEIGHT: 201 LBS | HEART RATE: 91 BPM | OXYGEN SATURATION: 96 % | DIASTOLIC BLOOD PRESSURE: 82 MMHG

## 2024-10-07 DIAGNOSIS — Z82.49 FAMILY HISTORY OF ATRIAL FIBRILLATION: ICD-10-CM

## 2024-10-07 DIAGNOSIS — F43.29 GRIEF REACTION WITH PROLONGED BEREAVEMENT: Primary | ICD-10-CM

## 2024-10-07 DIAGNOSIS — R00.2 PALPITATIONS: ICD-10-CM

## 2024-10-07 LAB
ALBUMIN SERPL-MCNC: 4.7 G/DL (ref 3.5–5.2)
ALBUMIN/GLOB SERPL: 1.8 G/DL
ALP SERPL-CCNC: 66 U/L (ref 39–117)
ALT SERPL W P-5'-P-CCNC: 14 U/L (ref 1–33)
ANION GAP SERPL CALCULATED.3IONS-SCNC: 9.9 MMOL/L (ref 5–15)
AST SERPL-CCNC: 17 U/L (ref 1–32)
BASOPHILS # BLD AUTO: 0.04 10*3/MM3 (ref 0–0.2)
BASOPHILS NFR BLD AUTO: 0.9 % (ref 0–1.5)
BILIRUB SERPL-MCNC: 0.4 MG/DL (ref 0–1.2)
BUN SERPL-MCNC: 16 MG/DL (ref 6–20)
BUN/CREAT SERPL: 16.2 (ref 7–25)
CALCIUM SPEC-SCNC: 10.1 MG/DL (ref 8.6–10.5)
CHLORIDE SERPL-SCNC: 100 MMOL/L (ref 98–107)
CO2 SERPL-SCNC: 28.1 MMOL/L (ref 22–29)
CREAT SERPL-MCNC: 0.99 MG/DL (ref 0.57–1)
DEPRECATED RDW RBC AUTO: 39.4 FL (ref 37–54)
EGFRCR SERPLBLD CKD-EPI 2021: 66.6 ML/MIN/1.73
EOSINOPHIL # BLD AUTO: 0.05 10*3/MM3 (ref 0–0.4)
EOSINOPHIL NFR BLD AUTO: 1.2 % (ref 0.3–6.2)
ERYTHROCYTE [DISTWIDTH] IN BLOOD BY AUTOMATED COUNT: 11.8 % (ref 12.3–15.4)
GLOBULIN UR ELPH-MCNC: 2.6 GM/DL
GLUCOSE SERPL-MCNC: 106 MG/DL (ref 65–99)
HCT VFR BLD AUTO: 40.3 % (ref 34–46.6)
HGB BLD-MCNC: 13.8 G/DL (ref 12–15.9)
IMM GRANULOCYTES # BLD AUTO: 0.03 10*3/MM3 (ref 0–0.05)
IMM GRANULOCYTES NFR BLD AUTO: 0.7 % (ref 0–0.5)
LYMPHOCYTES # BLD AUTO: 1.4 10*3/MM3 (ref 0.7–3.1)
LYMPHOCYTES NFR BLD AUTO: 33.1 % (ref 19.6–45.3)
MAGNESIUM SERPL-MCNC: 2.1 MG/DL (ref 1.6–2.6)
MCH RBC QN AUTO: 31.3 PG (ref 26.6–33)
MCHC RBC AUTO-ENTMCNC: 34.2 G/DL (ref 31.5–35.7)
MCV RBC AUTO: 91.4 FL (ref 79–97)
MONOCYTES # BLD AUTO: 0.43 10*3/MM3 (ref 0.1–0.9)
MONOCYTES NFR BLD AUTO: 10.2 % (ref 5–12)
NEUTROPHILS NFR BLD AUTO: 2.28 10*3/MM3 (ref 1.7–7)
NEUTROPHILS NFR BLD AUTO: 53.9 % (ref 42.7–76)
NRBC BLD AUTO-RTO: 0 /100 WBC (ref 0–0.2)
PLATELET # BLD AUTO: 272 10*3/MM3 (ref 140–450)
PMV BLD AUTO: 10.3 FL (ref 6–12)
POTASSIUM SERPL-SCNC: 4.3 MMOL/L (ref 3.5–5.2)
PROT SERPL-MCNC: 7.3 G/DL (ref 6–8.5)
RBC # BLD AUTO: 4.41 10*6/MM3 (ref 3.77–5.28)
SODIUM SERPL-SCNC: 138 MMOL/L (ref 136–145)
T4 FREE SERPL-MCNC: 1.04 NG/DL (ref 0.92–1.68)
TSH SERPL DL<=0.05 MIU/L-ACNC: 1.56 UIU/ML (ref 0.27–4.2)
WBC NRBC COR # BLD AUTO: 4.23 10*3/MM3 (ref 3.4–10.8)

## 2024-10-07 PROCEDURE — 85025 COMPLETE CBC W/AUTO DIFF WBC: CPT | Performed by: NURSE PRACTITIONER

## 2024-10-07 PROCEDURE — 84439 ASSAY OF FREE THYROXINE: CPT | Performed by: NURSE PRACTITIONER

## 2024-10-07 PROCEDURE — 83735 ASSAY OF MAGNESIUM: CPT | Performed by: NURSE PRACTITIONER

## 2024-10-07 PROCEDURE — 80053 COMPREHEN METABOLIC PANEL: CPT | Performed by: NURSE PRACTITIONER

## 2024-10-07 PROCEDURE — 99214 OFFICE O/P EST MOD 30 MIN: CPT | Performed by: NURSE PRACTITIONER

## 2024-10-07 PROCEDURE — 84443 ASSAY THYROID STIM HORMONE: CPT | Performed by: NURSE PRACTITIONER

## 2024-10-07 RX ORDER — VENLAFAXINE HYDROCHLORIDE 37.5 MG/1
37.5 CAPSULE, EXTENDED RELEASE ORAL DAILY
Qty: 90 CAPSULE | Refills: 1 | Status: SHIPPED | OUTPATIENT
Start: 2024-10-07

## 2024-10-07 NOTE — PROGRESS NOTES
Chief Complaint  Anxiety (Medication refills. )    Subjective            Vi Cruz presents to [facility]  History of Present Illness  Patient is here today for the medication refills regarding the Effexor for the grief reaction with prolonged bereavement-and patient has always done very well in the past with no side effects no issues reported and today still reports receiving good efficacy denies all SI/HI--and overall everything reported as stable    And then patient does admit that her mother and her aunt and their father which is her maternal grandfather all had A-fib and she had an episode last week 1 day where her heart was just racing for no reason and she could just really feel it in that throat area and no chest pain no syncope nothing like that but concerned her because of the family history--and no symptoms since then      PHQ-2 Total Score: 11  PHQ-9 Total Score: 11    History reviewed. No pertinent past medical history.    Allergies   Allergen Reactions    Milk-Related Compounds Other (See Comments)     GI UPSET     Milk (Cow) GI Intolerance    Adhesive Tape Rash    Other Itching     All METALS per patient        Past Surgical History:   Procedure Laterality Date    ANKLE ARTHROSCOPY Left     COLONOSCOPY N/A 2/29/2024    Procedure: COLONOSCOPY;  Surgeon: Selwyn Merchant MD;  Location: [facility] ENDOSCOPY;  Service: General;  Laterality: N/A;  NORMAL    HYSTERECTOMY      SHOULDER ARTHROSCOPY W/ ROTATOR CUFF REPAIR Left         Social History     Tobacco Use    Smoking status: Never    Smokeless tobacco: Never   Vaping Use    Vaping status: Never Used   Substance Use Topics    Alcohol use: Yes     Comment: social       Family History   Problem Relation Age of Onset    Diabetes Father     Cancer Father         prostate and bladder    Diabetes Brother     Cancer Brother         prostate    Diabetes Brother     Cancer Brother         testicular    Diabetes Brother      "    Health Maintenance Due   Topic Date Due    INFLUENZA VACCINE  08/01/2024        Current Outpatient Medications on File Prior to Visit   Medication Sig    Biotin 10 MG chewable tablet Chew.    cyanocobalamin (VITAMIN B-12) 1000 MCG tablet Take  by mouth Daily.    Omega-3 Fatty Acids (fish oil) 1000 MG capsule capsule Take  by mouth.    [DISCONTINUED] venlafaxine XR (EFFEXOR-XR) 37.5 MG 24 hr capsule TAKE 1 CAPSULE BY MOUTH DAILY    [DISCONTINUED] cholecalciferol (VITAMIN D3) 10 MCG (400 UNIT) tablet Take  by mouth Daily. (Patient not taking: Reported on 10/7/2024)     No current facility-administered medications on file prior to visit.       Immunization History   Administered Date(s) Administered    COVID-19 (UNSPECIFIED) 01/16/2021    Fluzone (or Fluarix & Flulaval for VFC) >6mos 10/30/2023    Shingrix 04/25/2021, 08/19/2021    Tdap 02/20/2024       Review of Systems   Constitutional:  Positive for fatigue.   HENT:  Negative for trouble swallowing.    Respiratory:  Negative for choking and shortness of breath.    Cardiovascular:  Positive for palpitations. Negative for chest pain.        Pt reports having had an episode of the rapid heart rate and mother and aunt and MGF with A fib    Gastrointestinal:  Negative for abdominal pain and blood in stool.   Neurological:  Negative for dizziness, seizures, syncope and light-headedness.   Psychiatric/Behavioral:  Positive for depressed mood. Negative for self-injury and suicidal ideas.         Grief         Objective     /82   Pulse 91   Temp 97.1 °F (36.2 °C) (Temporal)   Ht 168.9 cm (66.5\")   Wt 91.2 kg (201 lb)   SpO2 96%   BMI 31.96 kg/m²       Physical Exam  Vitals and nursing note reviewed.   Constitutional:       Appearance: Normal appearance.   HENT:      Head: Normocephalic.      Right Ear: External ear normal.      Left Ear: External ear normal.      Nose: Nose normal.      Mouth/Throat:      Mouth: Mucous membranes are moist.   Eyes:      " Pupils: Pupils are equal, round, and reactive to light.   Cardiovascular:      Rate and Rhythm: Normal rate and regular rhythm.      Heart sounds: Normal heart sounds.   Pulmonary:      Effort: Pulmonary effort is normal.      Breath sounds: Normal breath sounds.   Abdominal:      Palpations: Abdomen is soft.   Musculoskeletal:         General: Normal range of motion.      Cervical back: Normal range of motion.   Skin:     General: Skin is warm and dry.   Neurological:      Mental Status: She is alert and oriented to person, place, and time.   Psychiatric:         Mood and Affect: Mood normal.         Behavior: Behavior normal.         Thought Content: Thought content normal.         Judgment: Judgment normal.         Result Review :                           Assessment and Plan      Diagnoses and all orders for this visit:    1. Grief reaction with prolonged bereavement (Primary)  -     venlafaxine XR (EFFEXOR-XR) 37.5 MG 24 hr capsule; Take 1 capsule by mouth Daily.  Dispense: 90 capsule; Refill: 1    2. Palpitations  -     Ambulatory Referral to Cardiology  -     TSH+Free T4  -     CBC & Differential  -     Comprehensive Metabolic Panel  -     Magnesium    3. 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    Patient was given instructions and counseling regarding her condition or for health maintenance advice. Please see specific information pulled into the AVS if appropriate.      BMI is >= 30 and <35. (Class 1 Obesity). The following options were offered after discussion;: exercise counseling/recommendations and nutrition counseling/recommendations      Vi Cruz  reports that she has never smoked. She has never used smokeless tobacco. I have educated her on the risk of diseases from using tobacco products such as cancer, COPD, and heart disease.

## 2024-10-08 NOTE — PROGRESS NOTES
Please mail letter to patient stating    Vi blood counts were normal and thyroid levels and magnesium was normal and then the comprehensive panel does shows random glucose 106 and normal kidney and liver function test and normal electrolytes --just make sure you are remaining well-hydrated

## 2024-10-16 ENCOUNTER — OFFICE VISIT (OUTPATIENT)
Dept: CARDIOLOGY | Facility: CLINIC | Age: 59
End: 2024-10-16
Payer: COMMERCIAL

## 2024-10-16 VITALS
SYSTOLIC BLOOD PRESSURE: 147 MMHG | DIASTOLIC BLOOD PRESSURE: 101 MMHG | HEART RATE: 101 BPM | HEIGHT: 66 IN | WEIGHT: 202 LBS | BODY MASS INDEX: 32.47 KG/M2

## 2024-10-16 DIAGNOSIS — R00.2 PALPITATIONS: ICD-10-CM

## 2024-10-16 DIAGNOSIS — E78.2 MIXED HYPERLIPIDEMIA: ICD-10-CM

## 2024-10-16 DIAGNOSIS — G47.33 OSA (OBSTRUCTIVE SLEEP APNEA): ICD-10-CM

## 2024-10-16 DIAGNOSIS — R03.0 ELEVATED BLOOD PRESSURE READING: Primary | ICD-10-CM

## 2024-10-16 RX ORDER — MAGNESIUM GLUCONATE 27 MG(500)
500 TABLET ORAL 2 TIMES DAILY
COMMUNITY

## 2024-10-16 RX ORDER — ERGOCALCIFEROL 1.25 MG/1
50000 CAPSULE, LIQUID FILLED ORAL WEEKLY
COMMUNITY

## 2024-10-16 RX ORDER — MULTIVIT WITH MINERALS/LUTEIN
250 TABLET ORAL DAILY
COMMUNITY

## 2024-10-16 NOTE — PROGRESS NOTES
CARDIOLOGY INITIAL CONSULT       Chief Complaint  Palpitations    Subjective            Vi Cruz presents to [facility] C[facility]IOLOG[facility]  Palpitations       The patient comes for evaluation due to intermittent episodes of palpitations. Last episode was in the morning while taking a shower and lasted for 5 minutes. She denies dyspnea or angina. Her EKG showed normal sinus rhythm. Her BP was elevated 147/101 mmHg and was 150/99 mmHg after 5 minutes of rest. She denies history of smoking. She denies family history of CAD. Her Mother had Afib. The patient reports history of snoring.        Past History:    Medical History:History reviewed. No pertinent past medical history.    Surgical History: has a past surgical history that includes Hysterectomy; Ankle arthroscopy (Left); Shoulder arthroscopy w/ rotator cuff repair (Left); and Colonoscopy (N/A, 2/29/2024).     Family History: family history includes Cancer in her brother, brother, and father; Diabetes in her brother, brother, brother, and father.     Social History: reports that she has never smoked. She has never used smokeless tobacco. She reports current alcohol use.    Allergies: Milk-related compounds, Milk (cow), Adhesive tape, and Other    Current Outpatient Medications on File Prior to Visit   Medication Sig    Biotin 10 MG chewable tablet Chew.    Creatine Monohydrate powder Take 1 tablet by mouth Daily.    cyanocobalamin (VITAMIN B-12) 1000 MCG tablet Take  by mouth Daily.    magnesium gluconate (MAGONATE) 500 MG tablet Take 1 tablet by mouth 2 (Two) Times a Day.    melatonin 5 MG tablet tablet Take  by mouth.    Omega-3 Fatty Acids (fish oil) 1000 MG capsule capsule Take  by mouth.    venlafaxine XR (EFFEXOR-XR) 37.5 MG 24 hr capsule Take 1 capsule by mouth Daily.    vitamin C (ASCORBIC ACID) 250 MG tablet Take 1 tablet by mouth Daily.    vitamin D (ERGOCALCIFEROL) 1.25 MG (88970 UT) capsule capsule Take 1 capsule by mouth 1 (One) Time  "Per Week.     No current facility-administered medications on file prior to visit.          Review of Systems   Cardiovascular:  Positive for palpitations.    : All systems were reviewed and denied except for palpitations.     Objective     BP (!) 147/101 (BP Location: Left arm)   Pulse 101   Ht 167.6 cm (66\")   Wt 91.6 kg (202 lb)   BMI 32.60 kg/m²       Physical Exam    Alert, oriented  Neck: no JVD  Heart. Regular, no gallops, no rubs, no murmurs.   Lungs: No rales, no wheezing.   Abdomen: soft, bs+  LE: no edema  Neurologic. No motor deficits.   Result Review :     The following data was reviewed by: Nikolay Tee MD on 10/16/2024:    CMP          5/8/2024    07:03 10/7/2024    09:31   CMP   Glucose 102  106    BUN 14  16    Creatinine 0.92  0.99    EGFR 72.8  66.6    Sodium 141  138    Potassium 5.0  4.3    Chloride 103  100    Calcium 9.8  10.1    Total Protein 7.3  7.3    Albumin 4.8  4.7    Globulin 2.5  2.6    Total Bilirubin 0.5  0.4    Alkaline Phosphatase 62  66    AST (SGOT) 21  17    ALT (SGPT) 18  14    Albumin/Globulin Ratio 1.9  1.8    BUN/Creatinine Ratio 15.2  16.2    Anion Gap 9.0  9.9      CBC          5/8/2024    07:03 10/7/2024    09:32   CBC   WBC 4.37  4.23    RBC 4.49  4.41    Hemoglobin 13.4  13.8    Hematocrit 40.6  40.3    MCV 90.4  91.4    MCH 29.8  31.3    MCHC 33.0  34.2    RDW 12.3  11.8    Platelets 258  272      TSH          5/8/2024    07:03 10/7/2024    09:31   TSH   TSH 1.470  1.560      Lipid Panel          5/8/2024    07:03   Lipid Panel   Total Cholesterol 245    Triglycerides 86    HDL Cholesterol 100    VLDL Cholesterol 14    LDL Cholesterol  131    LDL/HDL Ratio 1.28         Data reviewed: Cardiology studies              ECG 12 Lead    Date/Time: 10/16/2024 3:26 PM  Performed by: Nikolay Gongora MD    Authorized by: Nikolay Gongora MD  Comparison: compared with previous ECG   Similar to previous ECG  Rhythm: sinus rhythm  Other findings: T wave abnormality       "        Assessment and Plan        Diagnoses and all orders for this visit:    1. Elevated blood pressure reading (Primary)  -     Adult Transthoracic Echo Complete W/ Cont if Necessary Per Protocol; Future    2. Palpitations  -     Holter Monitor - 72 Hour Up To 15 Days; Future    3. Mixed hyperlipidemia    4. ZEUS (obstructive sleep apnea)  -     Ambulatory Referral to Sleep Medicine        The patient has clinical features of metabolic syndrome. I advised to start to walk , 45 minutes per day, 5 times per week and to lose wt. I will obtain a cardiac monitor for 7 days to evaluate for arrhythmias. She will be scheduled for a 2 DECHO to assess for possible LVH and cardiac function. She is referred to the Sleep Medicine Department for evaluation of possible obstructive Sleep Apnea. Will re-evaluate her mixed hyperlipidemia in 3 months after lifestyle modification and wt loss. Advised to keep home BP logs am and pm, if persistent elevation, will initiate anti-hypertensives.       I spent 45 minutes caring for Vi on this date of service. This time includes time spent by me in the following activities:reviewing tests, obtaining and/or reviewing a separately obtained history, performing a medically appropriate examination and/or evaluation , ordering medications, tests, or procedures, and documenting information in the medical record    Follow Up     Return for After testing.    Patient was given instructions and counseling regarding her condition or for health maintenance advice. Please see specific information pulled into the AVS if appropriate.

## 2024-10-28 ENCOUNTER — HOSPITAL ENCOUNTER (OUTPATIENT)
Dept: CARDIOLOGY | Facility: HOSPITAL | Age: 59
Discharge: HOME | End: 2024-10-28
Admitting: INTERNAL MEDICINE
Payer: COMMERCIAL

## 2024-10-28 DIAGNOSIS — R03.0 ELEVATED BLOOD PRESSURE READING: ICD-10-CM

## 2024-10-28 PROCEDURE — 93306 TTE W/DOPPLER COMPLETE: CPT

## 2024-10-28 PROCEDURE — 93306 TTE W/DOPPLER COMPLETE: CPT | Performed by: INTERNAL MEDICINE

## 2024-10-29 LAB
ASCENDING AORTA: 2.5 CM
BH CV ECHO MEAS - AO MAX PG: 6.2 MMHG
BH CV ECHO MEAS - AO MEAN PG: 4 MMHG
BH CV ECHO MEAS - AO V2 MAX: 124.4 CM/SEC
BH CV ECHO MEAS - AO V2 VTI: 25.4 CM
BH CV ECHO MEAS - EDV(MOD-SP2): 122.5 ML
BH CV ECHO MEAS - EDV(MOD-SP4): 114 ML
BH CV ECHO MEAS - EF(MOD-SP2): 56.9 %
BH CV ECHO MEAS - EF(MOD-SP4): 64.2 %
BH CV ECHO MEAS - ESV(MOD-SP2): 52.8 ML
BH CV ECHO MEAS - ESV(MOD-SP4): 40.8 ML
BH CV ECHO MEAS - IVS/LVPW: 0.8 CM
BH CV ECHO MEAS - IVSD: 0.8 CM
BH CV ECHO MEAS - LA DIMENSION: 3.7 CM
BH CV ECHO MEAS - LAT PEAK E' VEL: 9.6 CM/SEC
BH CV ECHO MEAS - LV DIASTOLIC VOL/BSA (35-75): 56.4 CM2
BH CV ECHO MEAS - LV MAX PG: 5.8 MMHG
BH CV ECHO MEAS - LV MEAN PG: 2.8 MMHG
BH CV ECHO MEAS - LV SYSTOLIC VOL/BSA (12-30): 20.2 CM2
BH CV ECHO MEAS - LV V1 MAX: 120 CM/SEC
BH CV ECHO MEAS - LV V1 VTI: 25 CM
BH CV ECHO MEAS - LVIDD: 4.8 CM
BH CV ECHO MEAS - LVIDS: 2.05 CM
BH CV ECHO MEAS - LVOT DIAM: 2 CM
BH CV ECHO MEAS - LVPWD: 1 CM
BH CV ECHO MEAS - MED PEAK E' VEL: 9 CM/SEC
BH CV ECHO MEAS - MV A MAX VEL: 82.3 CM/SEC
BH CV ECHO MEAS - MV E MAX VEL: 67.5 CM/SEC
BH CV ECHO MEAS - MV E/A: 0.82
BH CV ECHO MEAS - SV(MOD-SP2): 69.7 ML
BH CV ECHO MEAS - SV(MOD-SP4): 73.2 ML
BH CV ECHO MEAS - SVI(MOD-SP2): 34.5 ML/M2
BH CV ECHO MEAS - SVI(MOD-SP4): 36.2 ML/M2
BH CV ECHO MEAS - TAPSE (>1.6): 2.9 CM
BH CV ECHO MEAS - TR MAX PG: 13.2 MMHG
BH CV ECHO MEAS - TR MAX VEL: 180.5 CM/SEC
BH CV ECHO MEASUREMENTS AVERAGE E/E' RATIO: 7.26
BH CV XLRA - RV BASE: 3.9 CM
BH CV XLRA - TDI S': 14.2 CM/SEC
IVRT: 97 MS
LEFT ATRIUM VOLUME INDEX: 19.2 ML/M2
SINUS: 2.5 CM

## 2025-05-08 DIAGNOSIS — F43.29 GRIEF REACTION WITH PROLONGED BEREAVEMENT: ICD-10-CM

## 2025-05-08 RX ORDER — VENLAFAXINE HYDROCHLORIDE 37.5 MG/1
37.5 CAPSULE, EXTENDED RELEASE ORAL DAILY
Qty: 30 CAPSULE | Refills: 0 | Status: SHIPPED | OUTPATIENT
Start: 2025-05-08

## 2025-05-15 ENCOUNTER — OFFICE VISIT (OUTPATIENT)
Dept: FAMILY MEDICINE CLINIC | Facility: CLINIC | Age: 60
End: 2025-05-15
Payer: COMMERCIAL

## 2025-05-15 VITALS
OXYGEN SATURATION: 94 % | HEART RATE: 92 BPM | SYSTOLIC BLOOD PRESSURE: 138 MMHG | HEIGHT: 66 IN | DIASTOLIC BLOOD PRESSURE: 78 MMHG | BODY MASS INDEX: 31.82 KG/M2 | TEMPERATURE: 97.9 F | WEIGHT: 198 LBS

## 2025-05-15 DIAGNOSIS — R73.03 PREDIABETES: ICD-10-CM

## 2025-05-15 DIAGNOSIS — Z12.31 SCREENING MAMMOGRAM FOR BREAST CANCER: ICD-10-CM

## 2025-05-15 DIAGNOSIS — E55.9 VITAMIN D DEFICIENCY: ICD-10-CM

## 2025-05-15 DIAGNOSIS — Z00.00 ANNUAL PHYSICAL EXAM: Primary | ICD-10-CM

## 2025-05-15 DIAGNOSIS — I10 PRIMARY HYPERTENSION: ICD-10-CM

## 2025-05-15 DIAGNOSIS — F43.29 GRIEF REACTION WITH PROLONGED BEREAVEMENT: ICD-10-CM

## 2025-05-15 RX ORDER — VENLAFAXINE HYDROCHLORIDE 37.5 MG/1
37.5 CAPSULE, EXTENDED RELEASE ORAL DAILY
Qty: 90 CAPSULE | Refills: 1 | Status: SHIPPED | OUTPATIENT
Start: 2025-05-15

## 2025-05-15 RX ORDER — LISINOPRIL 2.5 MG/1
2.5 TABLET ORAL DAILY
Qty: 90 TABLET | Refills: 1 | Status: SHIPPED | OUTPATIENT
Start: 2025-05-15

## 2025-05-15 NOTE — PROGRESS NOTES
Chief Complaint  Vitamin D Deficiency (Medication refills ) and Annual Exam (Needs Lipid lab)    Subjective            Vi Cruz presents to [facility]  History of Present Illness    History of Present Illness  The patient is a 59-year-old female who presents for evaluation of hypertension, prediabetes, vitamin D deficiency, and prolonged grief.  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 Her blood pressure was 138/70 today, and it was 136/80 during her last visit. In October 2024, her blood pressure was 147/101. She attributes her elevated blood pressure to her current overweight status and lack of physical activity. She is considering the initiation of lisinopril therapy and is curious about the potential for discontinuation if she achieves weight loss and improved fitness.  Denies chest pain shortness of breath syncopal episodes dizziness or lightheadedness but would like to go ahead and start a low-dose blood pressure medication    She has been managing her prediabetic condition for the past 2 months through dietary modifications, excluding exercise. Her A1c was 6% two years ago and 5.9% one year ago. She has also incorporated over-the-counter supplements into her regimen.  1 of which is a natural homeopathic that she gets from [employer] but has components in it that works like a GLP-1-taking K[employer]uan supplement.    She has reduced her vitamin D intake and is seeking to have her levels checked.    She is currently on a 3-month course of venlafaxine, which she reports as effective in managing her prolonged grief. She does not endorse any suicidal or self-injurious behavior.    She does not endorse any nipple discharge, breast lumps, or breast pain.  She does not endorse any chest pain, shortness of breath, or feeling overly fatigued. She does not endorse any dizziness, lightheadedness, seizures, fainting, polydipsia, polyphagia, polyuria, blurred vision, or double vision. She does not endorse any frequent headaches, abdominal pain, blood in the stool, vaginal bleeding, painful urination, or trouble swallowing. She admits to bruising and bleeding easily. She consumes 3 glasses of wine daily. She is up to date on her colonoscopy, which was done in 2024. She had coffee with some protein in it today. She has lost 4 pounds since October 2024.   PAST SURGICAL HISTORY:   - Hysterectomy    SOCIAL HISTORY  She drinks 3 glasses of wine a day. She has never smoked.      PHQ-2 Total Score: 2    PHQ-9 Total Score: 6      Past Medical History:   Diagnosis Date    Allergic     Metals, adhesives, milk protein    Anxiety 2010    Arthritis     Neck, back, jaw    Colon polyp 2017    Diabetes mellitus 2015    Pre-diabetes    Hypertension     Mild    Low back pain     Scoliosis    Obesity     Scoliosis        Allergies   Allergen Reactions    Milk-Related Compounds Other (See Comments)     GI UPSET     Milk (Cow) GI Intolerance    Adhesive Tape Rash    Other Itching     All METALS per patient        Past Surgical History:   Procedure Laterality Date    ANKLE ARTHROSCOPY Left     BREAST SURGERY  2017?    Partial mastectomy    COLONOSCOPY N/A 02/29/2024    Procedure: COLONOSCOPY;  Surgeon: Selwyn Merchant MD;  Location: [facility] ENDOSCOPY;  Service: General;  Laterality: N/A;  NORMAL    FRACTURE SURGERY      Shoulder & ankle - tendons only    HYSTERECTOMY      SHOULDER ARTHROSCOPY W/ ROTATOR CUFF REPAIR Left         Social History     Tobacco Use    Smoking status: Never     Passive exposure: Never    Smokeless tobacco: Never   Vaping Use    Vaping status: Never Used   Substance Use Topics    Alcohol use: Yes     Alcohol/week: 18.0 standard drinks of alcohol     Types: 18  Glasses of wine per week     Comment: social    Drug use: Never       Family History   Problem Relation Age of Onset    Diabetes Father     Cancer Father         Prostate, skin, bladder    Arthritis Father     Hyperlipidemia Father     Diabetes Brother     Cancer Brother         Prostate    Diabetes Brother     Cancer Brother         Testicular, Prostate    Diabetes Brother     Cancer Brother         Prostate    Anxiety disorder Mother     Arthritis Mother     Depression Mother     Diabetes Mother     Heart disease Mother         Afib    Hyperlipidemia Mother     Stroke Maternal Grandmother     Anxiety disorder Sister         Health Maintenance Due   Topic Date Due    ANNUAL PHYSICAL  02/20/2025    LIPID PANEL  05/08/2025        Current Outpatient Medications on File Prior to Visit   Medication Sig    Biotin 10 MG chewable tablet Chew.    Creatine Monohydrate powder Take 1 tablet by mouth Daily.    cyanocobalamin (VITAMIN B-12) 1000 MCG tablet Take  by mouth Daily.    magnesium gluconate (MAGONATE) 500 MG tablet Take 1 tablet by mouth 2 (Two) Times a Day.    melatonin 5 MG tablet tablet Take  by mouth.    Omega-3 Fatty Acids (fish oil) 1000 MG capsule capsule Take  by mouth.    vitamin C (ASCORBIC ACID) 250 MG tablet Take 1 tablet by mouth Daily.    vitamin D (ERGOCALCIFEROL) 1.25 MG (69676 UT) capsule capsule Take 1 capsule by mouth 1 (One) Time Per Week.    [DISCONTINUED] venlafaxine XR (EFFEXOR-XR) 37.5 MG 24 hr capsule TAKE 1 CAPSULE BY MOUTH DAILY     No current facility-administered medications on file prior to visit.       Immunization History   Administered Date(s) Administered    COVID-19 (UNSPECIFIED) 01/16/2021    Fluzone  >6mos 11/18/2024    Fluzone (or Fluarix & Flulaval for VFC) >6mos 10/30/2023    Shingrix 04/25/2021, 08/19/2021    Tdap 02/20/2024       Review of Systems   Constitutional:  Negative for fatigue, unexpected weight gain and unexpected weight loss.   HENT:  Negative for trouble  "swallowing.    Eyes:  Negative for blurred vision and double vision.   Respiratory:  Negative for shortness of breath.    Cardiovascular:  Negative for chest pain.   Gastrointestinal:  Negative for abdominal pain and blood in stool.   Endocrine: Negative for polydipsia, polyphagia and polyuria.   Genitourinary:  Negative for breast discharge, breast lump, breast pain, dysuria and vaginal bleeding.   Neurological:  Negative for dizziness, seizures, syncope, light-headedness and headache.   Hematological:  Bruises/bleeds easily.   Psychiatric/Behavioral:  Negative for self-injury and suicidal ideas.         Objective     /78   Pulse 92   Temp 97.9 °F (36.6 °C) (Temporal)   Ht 167.6 cm (66\")   Wt 89.8 kg (198 lb)   SpO2 94%   BMI 31.96 kg/m²       Physical Exam  Vitals and nursing note reviewed.   Constitutional:       Appearance: Normal appearance.   HENT:      Head: Normocephalic.      Right Ear: External ear normal.      Left Ear: External ear normal.      Nose: Nose normal.      Mouth/Throat:      Mouth: Mucous membranes are moist.   Eyes:      Pupils: Pupils are equal, round, and reactive to light.   Cardiovascular:      Rate and Rhythm: Normal rate and regular rhythm.      Heart sounds: Normal heart sounds.   Pulmonary:      Effort: Pulmonary effort is normal.      Breath sounds: Normal breath sounds.   Abdominal:      Palpations: Abdomen is soft.      Tenderness: There is no abdominal tenderness.   Musculoskeletal:         General: Normal range of motion.      Cervical back: Normal range of motion and neck supple.   Skin:     General: Skin is warm and dry.   Neurological:      Mental Status: She is alert and oriented to person, place, and time.   Psychiatric:         Mood and Affect: Mood normal.         Behavior: Behavior normal.         Thought Content: Thought content normal.         Judgment: Judgment normal.         Result Review :                    Results  Labs   - A1c: 6%   - A1c: 5.9%       "         Assessment and Plan      Diagnoses and all orders for this visit:    1. Annual physical exam (Primary)  -     Vitamin D,25-Hydroxy  -     Urinalysis With Culture If Indicated -  -     CBC & Differential  -     Comprehensive Metabolic Panel  -     Hemoglobin A1c  -     Lipid Panel  -     TSH Rfx On Abnormal To Free T4    2. Prediabetes  -     Vitamin D,25-Hydroxy  -     Urinalysis With Culture If Indicated -  -     CBC & Differential  -     Comprehensive Metabolic Panel  -     Hemoglobin A1c  -     Lipid Panel  -     TSH Rfx On Abnormal To Free T4    3. Grief reaction with prolonged bereavement  -     venlafaxine XR (EFFEXOR-XR) 37.5 MG 24 hr capsule; Take 1 capsule by mouth Daily.  Dispense: 90 capsule; Refill: 1    4. Vitamin D deficiency  -     Vitamin D,25-Hydroxy    5. Primary hypertension, stage 1  -     lisinopril (Zestril) 2.5 MG tablet; Take 1 tablet by mouth Daily.  Dispense: 90 tablet; Refill: 1    6. Screening mammogram for breast cancer  -     Mammo Screening Digital Tomosynthesis Bilateral With CAD; Future      Ordered mammogram as noted above    Labs ordered as noted above    Effexor refilled as noted above    And also starting the lowest dose of lisinopril 2.5 mg as noted above and patient will monitor this closely as well as advised her about the DASH diet    Assessment & Plan  1. Hypertension.  - Blood pressure readings have been consistently elevated, with a reading of 147/101 in 10/2024 and 136/80 during the last visit.  - Target range for blood pressure is between 110/60 and 128/130 over 80.  - A low dose of lisinopril 2.5 mg will be initiated.  - Advised to monitor for symptoms such as weakness, lightheadedness, palpitations, or fainting spells, and to maintain adequate hydration. Recommended the DASH diet as a natural approach to manage hypertension.    2. Prediabetes.  - A1c levels have been progressively increasing over the past two years, with a reading of 6% two years ago and 5.9%  one year ago.  - Comprehensive lab panel will be ordered to monitor prediabetic status.  - Discussed the importance of exercise in managing prediabetes.    3. Vitamin D deficiency.  - Currently taking over-the-counter supplements and has reduced vitamin D intake.  - Lab test will be ordered to assess current vitamin D levels.  - Advised on the potential risks of excessive vitamin D intake.    4. Prolonged grief.  - Currently on venlafaxine for prolonged grief.  - Prescription for a 90-day supply of venlafaxine will be sent to pharmacy.  - Patient reports the medication is working well for her condition.    5. Health maintenance.  - Overdue for mammogram, which was due last month.  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  - Reviewed and confirmed that the last colonoscopy was done in 2024 and is up to date.    Follow-up  - Scheduled for a follow-up visit in 6 months to assess the effectiveness of the lisinopril and overall health status.          Follow Up     Return in about 6 months (around 11/15/2025), or if symptoms worsen or fail to improve, for Recheck.    Patient was given instructions and counseling regarding her condition or for health maintenance advice. Please see specific information pulled into the AVS if appropriate.            Vi Cruz  reports that she has never smoked. She has never been exposed to tobacco smoke. She has never used smokeless tobacco. I have educated her on the risk of diseases from using tobacco products such as cancer, COPD, and heart disease.       Patient or patient representative verbalized consent for the use of Ambient Listening during the visit with  KEYANNA Davis for chart documentation. 5/15/2025  09:51 EDT

## 2025-06-11 ENCOUNTER — LAB (OUTPATIENT)
Facility: HOSPITAL | Age: 60
End: 2025-06-11
Payer: COMMERCIAL

## 2025-06-11 LAB
25(OH)D3 SERPL-MCNC: 52.6 NG/ML (ref 30–100)
ALBUMIN SERPL-MCNC: 4.6 G/DL (ref 3.5–5.2)
ALBUMIN/GLOB SERPL: 1.8 G/DL
ALP SERPL-CCNC: 63 U/L (ref 39–117)
ALT SERPL W P-5'-P-CCNC: 16 U/L (ref 1–33)
ANION GAP SERPL CALCULATED.3IONS-SCNC: 10.4 MMOL/L (ref 5–15)
AST SERPL-CCNC: 23 U/L (ref 1–32)
BACTERIA UR QL AUTO: ABNORMAL /HPF
BASOPHILS # BLD AUTO: 0.05 10*3/MM3 (ref 0–0.2)
BASOPHILS NFR BLD AUTO: 1.2 % (ref 0–1.5)
BILIRUB SERPL-MCNC: 0.4 MG/DL (ref 0–1.2)
BILIRUB UR QL STRIP: NEGATIVE
BUN SERPL-MCNC: 13 MG/DL (ref 6–20)
BUN/CREAT SERPL: 14.4 (ref 7–25)
CALCIUM SPEC-SCNC: 9.6 MG/DL (ref 8.6–10.5)
CHLORIDE SERPL-SCNC: 104 MMOL/L (ref 98–107)
CHOLEST SERPL-MCNC: 230 MG/DL (ref 0–200)
CLARITY UR: CLEAR
CO2 SERPL-SCNC: 26.6 MMOL/L (ref 22–29)
COLOR UR: YELLOW
CREAT SERPL-MCNC: 0.9 MG/DL (ref 0.57–1)
DEPRECATED RDW RBC AUTO: 42.6 FL (ref 37–54)
EGFRCR SERPLBLD CKD-EPI 2021: 74.3 ML/MIN/1.73
EOSINOPHIL # BLD AUTO: 0.12 10*3/MM3 (ref 0–0.4)
EOSINOPHIL NFR BLD AUTO: 3 % (ref 0.3–6.2)
ERYTHROCYTE [DISTWIDTH] IN BLOOD BY AUTOMATED COUNT: 12.4 % (ref 12.3–15.4)
GLOBULIN UR ELPH-MCNC: 2.5 GM/DL
GLUCOSE SERPL-MCNC: 92 MG/DL (ref 65–99)
GLUCOSE UR STRIP-MCNC: NEGATIVE MG/DL
HBA1C MFR BLD: 5.5 % (ref 4.8–5.6)
HCT VFR BLD AUTO: 39.2 % (ref 34–46.6)
HDLC SERPL-MCNC: 82 MG/DL (ref 40–60)
HGB BLD-MCNC: 13.3 G/DL (ref 12–15.9)
HGB UR QL STRIP.AUTO: NEGATIVE
HOLD SPECIMEN: NORMAL
HYALINE CASTS UR QL AUTO: ABNORMAL /LPF
IMM GRANULOCYTES # BLD AUTO: 0.04 10*3/MM3 (ref 0–0.05)
IMM GRANULOCYTES NFR BLD AUTO: 1 % (ref 0–0.5)
KETONES UR QL STRIP: NEGATIVE
LDLC SERPL CALC-MCNC: 120 MG/DL (ref 0–100)
LDLC/HDLC SERPL: 1.41 {RATIO}
LEUKOCYTE ESTERASE UR QL STRIP.AUTO: ABNORMAL
LYMPHOCYTES # BLD AUTO: 1.3 10*3/MM3 (ref 0.7–3.1)
LYMPHOCYTES NFR BLD AUTO: 32.3 % (ref 19.6–45.3)
MCH RBC QN AUTO: 31.5 PG (ref 26.6–33)
MCHC RBC AUTO-ENTMCNC: 33.9 G/DL (ref 31.5–35.7)
MCV RBC AUTO: 92.9 FL (ref 79–97)
MONOCYTES # BLD AUTO: 0.35 10*3/MM3 (ref 0.1–0.9)
MONOCYTES NFR BLD AUTO: 8.7 % (ref 5–12)
NEUTROPHILS NFR BLD AUTO: 2.17 10*3/MM3 (ref 1.7–7)
NEUTROPHILS NFR BLD AUTO: 53.8 % (ref 42.7–76)
NITRITE UR QL STRIP: NEGATIVE
NRBC BLD AUTO-RTO: 0 /100 WBC (ref 0–0.2)
PH UR STRIP.AUTO: 5.5 [PH] (ref 5–8)
PLATELET # BLD AUTO: 282 10*3/MM3 (ref 140–450)
PMV BLD AUTO: 10.1 FL (ref 6–12)
POTASSIUM SERPL-SCNC: 4.3 MMOL/L (ref 3.5–5.2)
PROT SERPL-MCNC: 7.1 G/DL (ref 6–8.5)
PROT UR QL STRIP: NEGATIVE
RBC # BLD AUTO: 4.22 10*6/MM3 (ref 3.77–5.28)
RBC # UR STRIP: ABNORMAL /HPF
REF LAB TEST METHOD: ABNORMAL
SODIUM SERPL-SCNC: 141 MMOL/L (ref 136–145)
SP GR UR STRIP: 1.01 (ref 1–1.03)
SQUAMOUS #/AREA URNS HPF: ABNORMAL /HPF
TRANS CELLS #/AREA URNS HPF: ABNORMAL /HPF
TRIGL SERPL-MCNC: 161 MG/DL (ref 0–150)
TSH SERPL DL<=0.05 MIU/L-ACNC: 1.08 UIU/ML (ref 0.27–4.2)
UROBILINOGEN UR QL STRIP: ABNORMAL
VLDLC SERPL-MCNC: 28 MG/DL (ref 5–40)
WBC # UR STRIP: ABNORMAL /HPF
WBC NRBC COR # BLD AUTO: 4.03 10*3/MM3 (ref 3.4–10.8)

## 2025-06-11 PROCEDURE — 85025 COMPLETE CBC W/AUTO DIFF WBC: CPT | Performed by: NURSE PRACTITIONER

## 2025-06-11 PROCEDURE — 81001 URINALYSIS AUTO W/SCOPE: CPT | Performed by: NURSE PRACTITIONER

## 2025-06-11 PROCEDURE — 83036 HEMOGLOBIN GLYCOSYLATED A1C: CPT | Performed by: NURSE PRACTITIONER

## 2025-06-11 PROCEDURE — 82306 VITAMIN D 25 HYDROXY: CPT | Performed by: NURSE PRACTITIONER

## 2025-06-11 PROCEDURE — 84443 ASSAY THYROID STIM HORMONE: CPT | Performed by: NURSE PRACTITIONER

## 2025-06-11 PROCEDURE — 80061 LIPID PANEL: CPT | Performed by: NURSE PRACTITIONER

## 2025-06-11 PROCEDURE — 87086 URINE CULTURE/COLONY COUNT: CPT | Performed by: NURSE PRACTITIONER

## 2025-06-11 PROCEDURE — 80053 COMPREHEN METABOLIC PANEL: CPT | Performed by: NURSE PRACTITIONER

## 2025-06-12 LAB — BACTERIA SPEC AEROBE CULT: NO GROWTH

## 2025-06-25 ENCOUNTER — HOSPITAL ENCOUNTER (OUTPATIENT)
Dept: MAMMOGRAPHY | Facility: HOSPITAL | Age: 60
Discharge: HOME | End: 2025-06-25
Admitting: NURSE PRACTITIONER
Payer: COMMERCIAL

## 2025-06-25 DIAGNOSIS — Z12.31 SCREENING MAMMOGRAM FOR BREAST CANCER: ICD-10-CM

## 2025-06-25 PROCEDURE — 77067 SCR MAMMO BI INCL CAD: CPT

## 2025-06-25 PROCEDURE — 77063 BREAST TOMOSYNTHESIS BI: CPT

## (undated) DEVICE — Device

## (undated) DEVICE — CONN JET HYDRA H20 AUXILIARY DISP

## (undated) DEVICE — GLV SURG SYNTH PROTEXIS PI LF PF 7

## (undated) DEVICE — LINER SURG CANSTR SXN S/RIGD 1500CC

## (undated) DEVICE — KT VLV BIOP DEFENDO SXN AIR/WATER

## (undated) DEVICE — TBG SXN CONN/F UNIV 1/4IN 10FT LF STRL

## (undated) DEVICE — SOL IRRG H2O PL/BG 1000ML STRL

## (undated) DEVICE — SOL IRR H2O BTL 1000ML STRL

## (undated) DEVICE — GLV SURG PROTEXIS PI BLU NEUTHERA PF 7.5

## (undated) DEVICE — SOLIDIFIER LIQLOC PLS 1500CC BT